# Patient Record
Sex: FEMALE | Race: WHITE | NOT HISPANIC OR LATINO | Employment: OTHER | URBAN - METROPOLITAN AREA
[De-identification: names, ages, dates, MRNs, and addresses within clinical notes are randomized per-mention and may not be internally consistent; named-entity substitution may affect disease eponyms.]

---

## 2017-06-03 ENCOUNTER — HOSPITAL ENCOUNTER (EMERGENCY)
Facility: HOSPITAL | Age: 77
Discharge: HOME | End: 2017-06-03
Attending: EMERGENCY MEDICINE | Admitting: EMERGENCY MEDICINE

## 2017-06-03 VITALS
SYSTOLIC BLOOD PRESSURE: 175 MMHG | RESPIRATION RATE: 20 BRPM | HEIGHT: 61 IN | WEIGHT: 120 LBS | BODY MASS INDEX: 22.66 KG/M2 | TEMPERATURE: 97.7 F | OXYGEN SATURATION: 98 % | HEART RATE: 95 BPM | DIASTOLIC BLOOD PRESSURE: 80 MMHG

## 2017-06-03 DIAGNOSIS — I10 ESSENTIAL HYPERTENSION: Primary | ICD-10-CM

## 2017-06-03 PROCEDURE — 99284 EMERGENCY DEPT VISIT MOD MDM: CPT | Performed by: EMERGENCY MEDICINE

## 2017-06-03 PROCEDURE — 99283 EMERGENCY DEPT VISIT LOW MDM: CPT

## 2017-06-03 RX ORDER — GLIMEPIRIDE 1 MG/1
1 TABLET ORAL
COMMUNITY

## 2017-06-03 RX ORDER — AMLODIPINE BESYLATE 5 MG/1
2.5 TABLET ORAL 2 TIMES DAILY
COMMUNITY
End: 2018-03-06 | Stop reason: SDUPTHER

## 2017-06-03 RX ORDER — CHLORAL HYDRATE 500 MG
CAPSULE ORAL
COMMUNITY

## 2017-06-03 RX ORDER — MULTIPLE VITAMINS W/ MINERALS TAB 9MG-400MCG
1 TAB ORAL DAILY
COMMUNITY

## 2017-06-04 NOTE — ED NOTES
Pt states that she had a headache and dizzyness earlier, but have since resolved     Denis Phillips, NELLI  06/03/17 2053

## 2017-06-04 NOTE — DISCHARGE INSTRUCTIONS
Follow-up with Dr. Coyne on Monday.  Return to the emergency department if your heart rate goes below 50.  Return if you have dizziness, weakness, chest pain, or shortness of breath.  Return to emergency department if your blood pressure is higher than 180/100.

## 2017-06-04 NOTE — ED PROVIDER NOTES
Subjective   History of Present Illness  History of Present Illness    Chief complaint: Elevated blood pressure and headache    Location: Frontal headache    Quality/Severity:  5/10 at its worst, 1/10 now    Timing/Onset/Duration: Blood pressure noted to be 237/111.      Modifying Factors: Nothing seems to make it better or worse    Associated Symptoms: She complains of a frontal headache.  No fever chills or cough.  No sore throat earache or nasal congestion.  No chest pain or shortness breath.  No abdominal pain.  No diarrhea or burning when she urinates.,  Tingling, weakness, or change in bladder or bowel function.    Narrative: This 76-year-old white female presents with complaint of a headache and elevated blood pressure.  Headache was a frontal headache at 5/10 at its worst, it is 2/10 now.  Patient took half of an 5 milligram amlodipine at 7 PM.  The patient denies any chest pain or shortness breath.  No abdominal pain.  No numbness, tingling, weakness or change in bladder or bowel function.  Patient has been compliant with her medications.    PCP:  Doretha      Review of Systems   Constitutional: Negative for chills and fever.   HENT: Negative for congestion, ear pain and sore throat.    Eyes: Negative for pain and discharge.   Respiratory: Negative for cough, chest tightness, shortness of breath and wheezing.    Cardiovascular: Negative for chest pain.   Gastrointestinal: Negative for abdominal pain, diarrhea, nausea and vomiting.   Genitourinary: Negative for dysuria and flank pain.   Musculoskeletal: Negative for back pain and neck pain.   Skin: Negative for rash.   Neurological: Positive for headaches. Negative for weakness and numbness.   Hematological: Negative for adenopathy.   Psychiatric/Behavioral: Negative for confusion.        Medication List      ASK your doctor about these medications          amLODIPine 5 MG tablet   Commonly known as:  NORVASC       fish oil 1000 MG capsule capsule        glimepiride 1 MG tablet   Commonly known as:  AMARYL       JANUVIA 50 MG tablet   Generic drug:  SITagliptin       multivitamin with minerals tablet tablet           History reviewed. No pertinent past medical history.    No Known Allergies    Past Surgical History:   Procedure Laterality Date   • EYE SURGERY         History reviewed. No pertinent family history.    Social History     Social History   • Marital status:      Spouse name: N/A   • Number of children: N/A   • Years of education: N/A     Social History Main Topics   • Smoking status: Never Smoker   • Smokeless tobacco: None   • Alcohol use Yes      Comment: SOCIAL   • Drug use: Defer   • Sexual activity: Defer     Other Topics Concern   • None     Social History Narrative   • None           Objective   Physical Exam   Constitutional: She is oriented to person, place, and time. She appears well-developed and well-nourished. No distress.   ED Triage Vitals:  Temp: 97.7 °F (36.5 °C) (06/03/17 2037)  Heart Rate: 95 (06/03/17 2037)  Resp: 20 (06/03/17 2037)  BP: 237/111 (06/03/17 2037)  SpO2: 98 % (06/03/17 2037)  Temp src: Oral (06/03/17 2037)  Heart Rate Source: n/a  Patient Position: Sitting (06/03/17 2037)  BP Location: Right arm (06/03/17 2037)  FiO2 (%): n/a    The patient's vitals were reviewed by me.  Unless otherwise noted they are within normal limits.     HENT:   Head: Normocephalic and atraumatic.   Right Ear: External ear normal.   Left Ear: External ear normal.   Nose: Nose normal.   Mouth/Throat: Oropharynx is clear and moist.   Eyes: Conjunctivae and EOM are normal. Pupils are equal, round, and reactive to light. Right eye exhibits no discharge. Left eye exhibits no discharge.   Neck: Normal range of motion. Neck supple. No JVD present. No tracheal deviation present. No thyromegaly present.   No meningeal signs   Cardiovascular: Normal rate, regular rhythm, normal heart sounds and intact distal pulses.  Exam reveals no gallop and no  friction rub.    No murmur heard.  Pulmonary/Chest: Effort normal and breath sounds normal. No stridor. No respiratory distress. She has no wheezes. She has no rales. She exhibits no tenderness.   Abdominal: Soft. Bowel sounds are normal. She exhibits no distension and no mass. There is no tenderness. There is no rebound and no guarding. No hernia.   Musculoskeletal: Normal range of motion. She exhibits no edema or deformity.   Lymphadenopathy:     She has no cervical adenopathy.   Neurological: She is alert and oriented to person, place, and time. No cranial nerve deficit. She exhibits normal muscle tone. Coordination normal.   Skin: Skin is warm and dry. No rash noted. She is not diaphoretic. No erythema. No pallor.   Psychiatric: Her behavior is normal.   Nursing note and vitals reviewed.      Procedures         ED Course  ED Course      9:40 PM, 06/03/17:  The patient feels better.  Her blood pressure is down.  The patient's blood pressure is 175/80.  Neurological exam: Conscious alert oriented ×4 with no focal deficits noted.    9:40 PM, 06/03/17:  The patient's diagnosis of hypertension was discussed with her.  The plan will be to discharge her home.  She should follow-up with Dr. Coyne next week.  She should return to the emergency department if she has increasing headache, numbness, tingling, weakness, difficulty swallowing or speaking, chest pain, shortness of breath, worse in any way at all.  All the patient's questions were answered she will be discharged in good condition.            MDM  No orders to display     Labs Reviewed - No data to display  No results found.    Final diagnoses:   None         ED Medications:  Medications - No data to display    New Medications:     Medication List      ASK your doctor about these medications          amLODIPine 5 MG tablet   Commonly known as:  NORVASC       fish oil 1000 MG capsule capsule       glimepiride 1 MG tablet   Commonly known as:  AMARYL       JANUVIA  50 MG tablet   Generic drug:  SITagliptin       multivitamin with minerals tablet tablet           Stopped Medications:     Medication List      ASK your doctor about these medications          amLODIPine 5 MG tablet   Commonly known as:  NORVASC       fish oil 1000 MG capsule capsule       glimepiride 1 MG tablet   Commonly known as:  AMARYL       JANUVIA 50 MG tablet   Generic drug:  SITagliptin       multivitamin with minerals tablet tablet             Final diagnoses:   Essential hypertension            Skip Fountain MD  06/03/17 7889

## 2017-07-11 ENCOUNTER — OFFICE VISIT (OUTPATIENT)
Dept: CARDIOLOGY | Facility: CLINIC | Age: 77
End: 2017-07-11

## 2017-07-11 VITALS
SYSTOLIC BLOOD PRESSURE: 160 MMHG | HEIGHT: 60 IN | BODY MASS INDEX: 22.79 KG/M2 | WEIGHT: 116.1 LBS | HEART RATE: 66 BPM | DIASTOLIC BLOOD PRESSURE: 74 MMHG

## 2017-07-11 DIAGNOSIS — I10 ESSENTIAL HYPERTENSION: Primary | ICD-10-CM

## 2017-07-11 DIAGNOSIS — R00.1 BRADYCARDIA: ICD-10-CM

## 2017-07-11 PROCEDURE — 93000 ELECTROCARDIOGRAM COMPLETE: CPT | Performed by: INTERNAL MEDICINE

## 2017-07-11 PROCEDURE — 99204 OFFICE O/P NEW MOD 45 MIN: CPT | Performed by: INTERNAL MEDICINE

## 2017-07-11 RX ORDER — BIMATOPROST 0.01 %
1 DROPS OPHTHALMIC (EYE) NIGHTLY
COMMUNITY
Start: 2017-06-26 | End: 2017-11-28 | Stop reason: ALTCHOICE

## 2017-07-11 NOTE — PROGRESS NOTES
Date of Office Visit: 2017  Encounter Provider: Philippe De Anda MD  Place of Service: [facility] CARDIOLOGY  Patient Name: Aida Vallejo  :1940    Chief Complaint   Patient presents with   • Hypertension   :     HPI: Aida Vallejo is a 76 y.o. female who presents today to be evaluated for hypertension.  I saw her in 2014 as well.  At that time, she had been on losartan and amlodipine but developed low blood pressure so her medications were adjusted.  She also noted periods where it felt like her heart rate dropped to the 50's and was irregular.  A Holter showed NSR with occasional sinus bradycardia, and rare periods of junctional escape (average rate in the 40's).      Those episodes have continued.  Sometimes it may occur twice a week, then she may go several weeks without it.      She has self-decreased her amlodipine (she was taking 1/4 of a 5mg tablet).  A few weeks ago she had to go to the ED for profound hypertension (237/111).  She checks her pressure twice a day every day and while it had been high, this was exceedingly high.  She was then placed on amlodipine 2.5mg BID, and her SBP at home ranges from 120-170 mm Hg, with an average SBP of 150 mm Hg.  This is her goal, and she doesn't want it lower than this.      She denies syncope, edema, orthopnea, PND, dyspnea, or chest pain.    Past Medical History:   Diagnosis Date   • Diabetes mellitus, type 2    • Hypertension        Past Surgical History:   Procedure Laterality Date   • EYE SURGERY         Social History     Social History   • Marital status:      Spouse name: N/A   • Number of children: N/A   • Years of education: N/A     Occupational History   • Not on file.     Social History Main Topics   • Smoking status: Never Smoker   • Smokeless tobacco: Never Used   • Alcohol use No   • Drug use: Defer   • Sexual activity: Defer     Other Topics Concern   • Not on file     Social History Narrative  "      History reviewed. No pertinent family history.    Review of Systems   Constitution: Negative for malaise/fatigue.   HENT: Positive for headaches.    Eyes: Negative for blurred vision.   Cardiovascular: Negative for chest pain, orthopnea, palpitations and paroxysmal nocturnal dyspnea.   Respiratory: Negative for shortness of breath.    Musculoskeletal: Negative for neck pain.   All other systems reviewed and are negative.      No Known Allergies      Current Outpatient Prescriptions:   •  amLODIPine (NORVASC) 5 MG tablet, Take 2.5 mg by mouth 2 (Two) Times a Day., Disp: , Rfl:   •  CALCIUM PO, Take 600 mg by mouth Daily., Disp: , Rfl:   •  Cholecalciferol (VITAMIN D PO), Take 1,000 Units by mouth., Disp: , Rfl:   •  CINNAMON PO, Take 1,000 mg by mouth Daily., Disp: , Rfl:   •  Coenzyme Q10 (CO Q 10 PO), Take 300 mg by mouth Daily., Disp: , Rfl:   •  glimepiride (AMARYL) 1 MG tablet, Take 1 mg by mouth Every Morning Before Breakfast., Disp: , Rfl:   •  LUMIGAN 0.01 % ophthalmic drops, Administer 1 drop to both eyes Every Night., Disp: , Rfl:   •  Multiple Vitamins-Minerals (MULTIVITAMIN WITH MINERALS) tablet tablet, Take 1 tablet by mouth Daily., Disp: , Rfl:   •  Omega-3 Fatty Acids (FISH OIL) 1000 MG capsule capsule, Take  by mouth Daily With Breakfast., Disp: , Rfl:   •  SITagliptin (JANUVIA) 50 MG tablet, Take 50 mg by mouth Daily., Disp: , Rfl:      Objective:     Vitals:    07/11/17 1355   BP: 160/74   Pulse: 66   Weight: 116 lb 1.6 oz (52.7 kg)   Height: 60\" (152.4 cm)     Body mass index is 22.67 kg/(m^2).    Physical Exam   Constitutional: She is oriented to person, place, and time. She appears well-developed and well-nourished.   HENT:   Head: Normocephalic.   Nose: Nose normal.   Mouth/Throat: Oropharynx is clear and moist.   Eyes: Conjunctivae and EOM are normal. Pupils are equal, round, and reactive to light.   Neck: Normal range of motion. No JVD present.   Cardiovascular: Normal rate, regular " rhythm, normal heart sounds and intact distal pulses.    No murmur heard.  Pulmonary/Chest: Effort normal and breath sounds normal.   Abdominal: Soft. She exhibits no mass. There is no tenderness.   Musculoskeletal: Normal range of motion. She exhibits no edema.   Lymphadenopathy:     She has no cervical adenopathy.   Neurological: She is alert and oriented to person, place, and time. No cranial nerve deficit.   Skin: Skin is warm and dry. No rash noted.   Psychiatric: She has a normal mood and affect. Her behavior is normal. Judgment and thought content normal.   Vitals reviewed.        ECG 12 Lead  Date/Time: 7/11/2017 3:44 PM  Performed by: PHILIPPE DE ANDA  Authorized by: PHILIPPE DE ANDA   Comparison: compared with previous ECG   Rhythm: sinus rhythm  Conduction: conduction normal  ST Segments: ST segments normal  T Waves: T waves normal  QRS axis: normal  Other: no other findings  Clinical impression: normal ECG              Assessment:       Diagnosis Plan   1. Essential hypertension  Duplex Renal Artery - Bilateral Complete CAR   2. Bradycardia  Holter / Event ZIO Patch          Plan:       1.  Her average SBP is 150 mm Hg, which is her goal.  She states that she feels very poor when it's consistently lower than this.  Obviously this is not ideal but she is quite committed to this.  I am going to check a renal artery doppler as she had such a profound spike on the day of her ED visit.    2.  She continues to have intermittent episodes of low heart rate; a prior Holter showed a junctional escape rhythm.  I am going to get a Zio to assess the burden of this.      Sincerely,       Philippe De Anda MD

## 2017-08-02 DIAGNOSIS — I49.49 JUNCTIONAL ESCAPE RHYTHM: Primary | ICD-10-CM

## 2017-08-07 ENCOUNTER — TELEPHONE (OUTPATIENT)
Dept: CARDIOLOGY | Facility: CLINIC | Age: 77
End: 2017-08-07

## 2017-08-07 NOTE — TELEPHONE ENCOUNTER
Can you set this pt up for the renal doppler? She is also to have a holter. It might be easier to have it done the same day as her doppler, but if she wants if for BNE, let me know.

## 2017-08-07 NOTE — TELEPHONE ENCOUNTER
My understanding is that they don't perform renal artery dopplers at [facility].  Has that changed?    JDK

## 2017-08-07 NOTE — TELEPHONE ENCOUNTER
S/w Neri in scheduling. Do you want pt to have her duplex done at [facility]? If so, Neri said the order needed to be changed to US. Pls advise.

## 2017-08-11 ENCOUNTER — TELEPHONE (OUTPATIENT)
Dept: CARDIOLOGY | Facility: CLINIC | Age: 77
End: 2017-08-11

## 2017-08-11 NOTE — TELEPHONE ENCOUNTER
Pt called to inquire her upcoming testing.  Order is for pt to have a Renal Artery duplex.  Pt left a vm that she has had a Carotid US.  I called pt back to inform her that the test are not the same, but had to leave a vm.

## 2017-08-15 ENCOUNTER — HOSPITAL ENCOUNTER (OUTPATIENT)
Dept: CARDIOLOGY | Facility: HOSPITAL | Age: 77
Discharge: HOME | End: 2017-08-15
Attending: INTERNAL MEDICINE | Admitting: INTERNAL MEDICINE

## 2017-08-15 DIAGNOSIS — I49.49 JUNCTIONAL ESCAPE RHYTHM: ICD-10-CM

## 2017-08-15 PROCEDURE — 93226 XTRNL ECG REC<48 HR SCAN A/R: CPT

## 2017-08-15 PROCEDURE — 93225 XTRNL ECG REC<48 HRS REC: CPT

## 2017-08-17 PROCEDURE — 93227 XTRNL ECG REC<48 HR R&I: CPT | Performed by: INTERNAL MEDICINE

## 2017-09-20 ENCOUNTER — HOSPITAL ENCOUNTER (OUTPATIENT)
Dept: CARDIOLOGY | Facility: HOSPITAL | Age: 77
Discharge: HOME | End: 2017-09-20
Attending: INTERNAL MEDICINE | Admitting: INTERNAL MEDICINE

## 2017-09-20 DIAGNOSIS — I10 ESSENTIAL HYPERTENSION: ICD-10-CM

## 2017-09-20 PROCEDURE — 93975 VASCULAR STUDY: CPT | Performed by: INTERNAL MEDICINE

## 2017-09-21 LAB
BH CV ECHO MEAS - DIST REN A EDV LEFT: -21.5 CM/SEC
BH CV ECHO MEAS - DIST REN A PSV LEFT: -134 CM/SEC
BH CV ECHO MEAS - DIST REN A RI LEFT: 0.84
BH CV ECHO MEAS - MID REN A EDV LEFT: -29.3 CM/SEC
BH CV ECHO MEAS - MID REN A PSV LEFT: -117 CM/SEC
BH CV ECHO MEAS - MID REN A RI LEFT: 0.75
BH CV ECHO MEAS - PROX REN A EDV LEFT: -22.5 CM/SEC
BH CV ECHO MEAS - PROX REN A PSV LEFT: -127 CM/SEC
BH CV ECHO MEAS - PROX REN A RI LEFT: 0.82
BH CV VAS KIDNEY HEIGHT LEFT: 4.4 CM
BH CV VAS RENAL AORTIC MID PSV: 134 CM/S
BH CV XLRA MEAS - KID L LEFT: 9.3 CM
BH CV XLRA MEAS DIST REN A EDV RIGHT: -45 CM/SEC
BH CV XLRA MEAS DIST REN A PSV RIGHT: -153 CM/SEC
BH CV XLRA MEAS DIST REN A RI RIGHT: 0.71
BH CV XLRA MEAS KID H RIGHT: 4.6 CM
BH CV XLRA MEAS KID L RIGHT: 8.9 CM
BH CV XLRA MEAS MID REN A EDV RIGHT: -20.5 CM/SEC
BH CV XLRA MEAS MID REN A PSV RIGHT: -114 CM/SEC
BH CV XLRA MEAS MID REN A RI RIGHT: 0.82
BH CV XLRA MEAS PROX REN A EDV RIGHT: -24.4 CM/SEC
BH CV XLRA MEAS PROX REN A PSV RIGHT: -126 CM/SEC
BH CV XLRA MEAS PROX REN A RI RIGHT: 0.81
BH CV XLRA MEAS RAR LEFT: 1
BH CV XLRA MEAS RAR RIGHT: 1.14

## 2017-09-22 ENCOUNTER — HOSPITAL ENCOUNTER (INPATIENT)
Facility: HOSPITAL | Age: 77
LOS: 3 days | Discharge: HOME | End: 2017-09-25
Attending: INTERNAL MEDICINE | Admitting: INTERNAL MEDICINE

## 2017-09-22 ENCOUNTER — HOSPITAL ENCOUNTER (EMERGENCY)
Facility: HOSPITAL | Age: 77
Discharge: ANOTHER HOSPITAL | End: 2017-09-22
Attending: EMERGENCY MEDICINE | Admitting: EMERGENCY MEDICINE

## 2017-09-22 ENCOUNTER — APPOINTMENT (OUTPATIENT)
Dept: GENERAL RADIOLOGY | Facility: HOSPITAL | Age: 77
End: 2017-09-22

## 2017-09-22 ENCOUNTER — APPOINTMENT (OUTPATIENT)
Dept: CARDIOLOGY | Facility: HOSPITAL | Age: 77
End: 2017-09-22
Attending: INTERNAL MEDICINE

## 2017-09-22 VITALS
DIASTOLIC BLOOD PRESSURE: 65 MMHG | WEIGHT: 116 LBS | HEIGHT: 60 IN | OXYGEN SATURATION: 97 % | HEART RATE: 38 BPM | SYSTOLIC BLOOD PRESSURE: 135 MMHG | TEMPERATURE: 97.6 F | BODY MASS INDEX: 22.78 KG/M2 | RESPIRATION RATE: 16 BRPM

## 2017-09-22 DIAGNOSIS — R00.1 SYMPTOMATIC BRADYCARDIA: Primary | ICD-10-CM

## 2017-09-22 DIAGNOSIS — E87.1 HYPONATREMIA: ICD-10-CM

## 2017-09-22 DIAGNOSIS — R79.0 LOW MAGNESIUM LEVELS: ICD-10-CM

## 2017-09-22 LAB
ALBUMIN SERPL-MCNC: 3.9 G/DL (ref 3.5–5.2)
ALBUMIN/GLOB SERPL: 1.2 G/DL
ALP SERPL-CCNC: 49 U/L (ref 40–129)
ALT SERPL W P-5'-P-CCNC: 50 U/L (ref 5–33)
ANION GAP SERPL CALCULATED.3IONS-SCNC: 16.7 MMOL/L
APTT PPP: 43.5 SECONDS (ref 24.3–38.1)
AST SERPL-CCNC: 46 U/L (ref 5–32)
BASOPHILS # BLD AUTO: 0.04 10*3/MM3 (ref 0–0.2)
BASOPHILS NFR BLD AUTO: 0.4 % (ref 0–2)
BH CV ECHO MEAS - ACS: 1.7 CM
BH CV ECHO MEAS - AO MAX PG (FULL): 5.2 MMHG
BH CV ECHO MEAS - AO MAX PG: 10.8 MMHG
BH CV ECHO MEAS - AO MEAN PG (FULL): 2 MMHG
BH CV ECHO MEAS - AO MEAN PG: 5 MMHG
BH CV ECHO MEAS - AO ROOT AREA (BSA CORRECTED): 1.6
BH CV ECHO MEAS - AO ROOT AREA: 4.9 CM^2
BH CV ECHO MEAS - AO ROOT DIAM: 2.5 CM
BH CV ECHO MEAS - AO V2 MAX: 164 CM/SEC
BH CV ECHO MEAS - AO V2 MEAN: 107 CM/SEC
BH CV ECHO MEAS - AO V2 VTI: 41 CM
BH CV ECHO MEAS - AVA(I,A): 2 CM^2
BH CV ECHO MEAS - AVA(I,D): 2 CM^2
BH CV ECHO MEAS - AVA(V,A): 2 CM^2
BH CV ECHO MEAS - AVA(V,D): 2 CM^2
BH CV ECHO MEAS - BSA(HAYCOCK): 1.5 M^2
BH CV ECHO MEAS - BSA: 1.5 M^2
BH CV ECHO MEAS - BZI_BMI: 24 KILOGRAMS/M^2
BH CV ECHO MEAS - BZI_METRIC_HEIGHT: 152.4 CM
BH CV ECHO MEAS - BZI_METRIC_WEIGHT: 55.8 KG
BH CV ECHO MEAS - CONTRAST EF (2CH): 72 ML/M^2
BH CV ECHO MEAS - CONTRAST EF 4CH: 68 ML/M^2
BH CV ECHO MEAS - EDV(CUBED): 46.7 ML
BH CV ECHO MEAS - EDV(MOD-SP2): 75 ML
BH CV ECHO MEAS - EDV(MOD-SP4): 75 ML
BH CV ECHO MEAS - EDV(TEICH): 54.4 ML
BH CV ECHO MEAS - EF(CUBED): 57.8 %
BH CV ECHO MEAS - EF(MOD-SP2): 72 %
BH CV ECHO MEAS - EF(MOD-SP4): 68 %
BH CV ECHO MEAS - EF(TEICH): 50.4 %
BH CV ECHO MEAS - ESV(CUBED): 19.7 ML
BH CV ECHO MEAS - ESV(MOD-SP2): 21 ML
BH CV ECHO MEAS - ESV(MOD-SP4): 24 ML
BH CV ECHO MEAS - ESV(TEICH): 27 ML
BH CV ECHO MEAS - FS: 25 %
BH CV ECHO MEAS - IVS/LVPW: 1
BH CV ECHO MEAS - IVSD: 1.2 CM
BH CV ECHO MEAS - LAT PEAK E' VEL: 6 CM/SEC
BH CV ECHO MEAS - LV DIASTOLIC VOL/BSA (35-75): 49.4 ML/M^2
BH CV ECHO MEAS - LV MASS(C)D: 141.5 GRAMS
BH CV ECHO MEAS - LV MASS(C)DI: 93.2 GRAMS/M^2
BH CV ECHO MEAS - LV MAX PG: 5.6 MMHG
BH CV ECHO MEAS - LV MEAN PG: 3 MMHG
BH CV ECHO MEAS - LV SYSTOLIC VOL/BSA (12-30): 15.8 ML/M^2
BH CV ECHO MEAS - LV V1 MAX: 118 CM/SEC
BH CV ECHO MEAS - LV V1 MEAN: 72.7 CM/SEC
BH CV ECHO MEAS - LV V1 VTI: 29.1 CM
BH CV ECHO MEAS - LVIDD: 3.6 CM
BH CV ECHO MEAS - LVIDS: 2.7 CM
BH CV ECHO MEAS - LVLD AP2: 6.5 CM
BH CV ECHO MEAS - LVLD AP4: 6.4 CM
BH CV ECHO MEAS - LVLS AP2: 6 CM
BH CV ECHO MEAS - LVLS AP4: 5.4 CM
BH CV ECHO MEAS - LVOT AREA (M): 2.8 CM^2
BH CV ECHO MEAS - LVOT AREA: 2.8 CM^2
BH CV ECHO MEAS - LVOT DIAM: 1.9 CM
BH CV ECHO MEAS - LVPWD: 1.2 CM
BH CV ECHO MEAS - MED PEAK E' VEL: 5 CM/SEC
BH CV ECHO MEAS - MV A DUR: 0.14 SEC
BH CV ECHO MEAS - MV A MAX VEL: 143 CM/SEC
BH CV ECHO MEAS - MV DEC SLOPE: 345 CM/SEC^2
BH CV ECHO MEAS - MV DEC TIME: 0.27 SEC
BH CV ECHO MEAS - MV E MAX VEL: 104 CM/SEC
BH CV ECHO MEAS - MV E/A: 0.73
BH CV ECHO MEAS - MV MAX PG: 8.6 MMHG
BH CV ECHO MEAS - MV MEAN PG: 3 MMHG
BH CV ECHO MEAS - MV P1/2T MAX VEL: 112 CM/SEC
BH CV ECHO MEAS - MV P1/2T: 95.1 MSEC
BH CV ECHO MEAS - MV V2 MAX: 147 CM/SEC
BH CV ECHO MEAS - MV V2 MEAN: 74 CM/SEC
BH CV ECHO MEAS - MV V2 VTI: 45.2 CM
BH CV ECHO MEAS - MVA P1/2T LCG: 2 CM^2
BH CV ECHO MEAS - MVA(P1/2T): 2.3 CM^2
BH CV ECHO MEAS - MVA(VTI): 1.8 CM^2
BH CV ECHO MEAS - PA ACC TIME: 0.13 SEC
BH CV ECHO MEAS - PA MAX PG (FULL): 0.9 MMHG
BH CV ECHO MEAS - PA MAX PG: 3.5 MMHG
BH CV ECHO MEAS - PA PR(ACCEL): 18.7 MMHG
BH CV ECHO MEAS - PA V2 MAX: 94.1 CM/SEC
BH CV ECHO MEAS - PULM A REVS DUR: 0.16 SEC
BH CV ECHO MEAS - PULM A REVS VEL: 30.6 CM/SEC
BH CV ECHO MEAS - PULM DIAS VEL: 39.8 CM/SEC
BH CV ECHO MEAS - PULM S/D: 1.7
BH CV ECHO MEAS - PULM SYS VEL: 69.4 CM/SEC
BH CV ECHO MEAS - PVA(V,A): 1.7 CM^2
BH CV ECHO MEAS - PVA(V,D): 1.7 CM^2
BH CV ECHO MEAS - QP/QS: 0.5
BH CV ECHO MEAS - RAP SYSTOLE: 8 MMHG
BH CV ECHO MEAS - RV MAX PG: 2.6 MMHG
BH CV ECHO MEAS - RV MEAN PG: 2 MMHG
BH CV ECHO MEAS - RV V1 MAX: 81.3 CM/SEC
BH CV ECHO MEAS - RV V1 MEAN: 60.5 CM/SEC
BH CV ECHO MEAS - RV V1 VTI: 20.4 CM
BH CV ECHO MEAS - RVOT AREA: 2 CM^2
BH CV ECHO MEAS - RVOT DIAM: 1.6 CM
BH CV ECHO MEAS - SI(AO): 132.6 ML/M^2
BH CV ECHO MEAS - SI(CUBED): 17.8 ML/M^2
BH CV ECHO MEAS - SI(LVOT): 54.3 ML/M^2
BH CV ECHO MEAS - SI(MOD-SP2): 35.6 ML/M^2
BH CV ECHO MEAS - SI(MOD-SP4): 33.6 ML/M^2
BH CV ECHO MEAS - SI(TEICH): 18.1 ML/M^2
BH CV ECHO MEAS - SV(AO): 201.3 ML
BH CV ECHO MEAS - SV(CUBED): 27 ML
BH CV ECHO MEAS - SV(LVOT): 82.5 ML
BH CV ECHO MEAS - SV(MOD-SP2): 54 ML
BH CV ECHO MEAS - SV(MOD-SP4): 51 ML
BH CV ECHO MEAS - SV(RVOT): 41 ML
BH CV ECHO MEAS - SV(TEICH): 27.4 ML
BH CV ECHO MEAS - TAPSE (>1.6): 2.4 CM2
BH CV VAS BP RIGHT ARM: NORMAL MMHG
BH CV XLRA - RV BASE: 3.11 CM
BH CV XLRA - TDI S': 12 CM/SEC
BILIRUB SERPL-MCNC: 0.4 MG/DL (ref 0.2–1.2)
BUN BLD-MCNC: 33 MG/DL (ref 8–23)
BUN/CREAT SERPL: 20.5 (ref 7–25)
CALCIUM SPEC-SCNC: 8.6 MG/DL (ref 8.8–10.5)
CHLORIDE SERPL-SCNC: 89 MMOL/L (ref 98–107)
CO2 SERPL-SCNC: 20.3 MMOL/L (ref 22–29)
CREAT BLD-MCNC: 1.61 MG/DL (ref 0.57–1)
DEPRECATED RDW RBC AUTO: 41 FL (ref 37–54)
E/E' RATIO: 20
EOSINOPHIL # BLD AUTO: 0.14 10*3/MM3 (ref 0.1–0.3)
EOSINOPHIL NFR BLD AUTO: 1.5 % (ref 0–4)
ERYTHROCYTE [DISTWIDTH] IN BLOOD BY AUTOMATED COUNT: 13.2 % (ref 11.5–14.5)
GFR SERPL CREATININE-BSD FRML MDRD: 31 ML/MIN/1.73
GLOBULIN UR ELPH-MCNC: 3.2 GM/DL
GLUCOSE BLD-MCNC: 154 MG/DL (ref 65–99)
GLUCOSE BLDC GLUCOMTR-MCNC: 119 MG/DL (ref 70–130)
GLUCOSE BLDC GLUCOMTR-MCNC: 88 MG/DL (ref 70–130)
HCT VFR BLD AUTO: 38.4 % (ref 37–47)
HGB BLD-MCNC: 12.9 G/DL (ref 12–16)
HOLD SPECIMEN: NORMAL
HOLD SPECIMEN: NORMAL
IMM GRANULOCYTES # BLD: 0.02 10*3/MM3 (ref 0–0.03)
IMM GRANULOCYTES NFR BLD: 0.2 % (ref 0–0.5)
INR PPP: 1.02 (ref 0.9–1.1)
LEFT ATRIUM VOLUME INDEX: 30 ML/M2
LV EF 2D ECHO EST: 68 %
LYMPHOCYTES # BLD AUTO: 2.69 10*3/MM3 (ref 0.6–4.8)
LYMPHOCYTES NFR BLD AUTO: 29.6 % (ref 20–45)
MAGNESIUM SERPL-MCNC: 1.3 MG/DL (ref 1.7–2.5)
MAGNESIUM SERPL-MCNC: 1.8 MG/DL (ref 1.6–2.4)
MCH RBC QN AUTO: 28.9 PG (ref 27–31)
MCHC RBC AUTO-ENTMCNC: 33.6 G/DL (ref 31–37)
MCV RBC AUTO: 85.9 FL (ref 81–99)
MONOCYTES # BLD AUTO: 0.95 10*3/MM3 (ref 0–1)
MONOCYTES NFR BLD AUTO: 10.4 % (ref 3–8)
NEUTROPHILS # BLD AUTO: 5.26 10*3/MM3 (ref 1.5–8.3)
NEUTROPHILS NFR BLD AUTO: 57.9 % (ref 45–70)
NRBC BLD MANUAL-RTO: 0 /100 WBC (ref 0–0)
PLATELET # BLD AUTO: 227 10*3/MM3 (ref 140–500)
PMV BLD AUTO: 10.1 FL (ref 7.4–10.4)
POTASSIUM BLD-SCNC: 4.8 MMOL/L (ref 3.5–5.2)
PROT SERPL-MCNC: 7.1 G/DL (ref 6–8.5)
PROTHROMBIN TIME: 13.4 SECONDS (ref 12.1–15)
RBC # BLD AUTO: 4.47 10*6/MM3 (ref 4.2–5.4)
SODIUM BLD-SCNC: 126 MMOL/L (ref 136–145)
TROPONIN T SERPL-MCNC: <0.01 NG/ML (ref 0–0.03)
TROPONIN T SERPL-MCNC: <0.01 NG/ML (ref 0–0.03)
TSH SERPL DL<=0.05 MIU/L-ACNC: 2.35 MIU/ML (ref 0.27–4.2)
WBC NRBC COR # BLD: 9.1 10*3/MM3 (ref 4.8–10.8)
WHOLE BLOOD HOLD SPECIMEN: NORMAL
WHOLE BLOOD HOLD SPECIMEN: NORMAL

## 2017-09-22 PROCEDURE — 84484 ASSAY OF TROPONIN QUANT: CPT

## 2017-09-22 PROCEDURE — 83735 ASSAY OF MAGNESIUM: CPT

## 2017-09-22 PROCEDURE — 82962 GLUCOSE BLOOD TEST: CPT

## 2017-09-22 PROCEDURE — 99284 EMERGENCY DEPT VISIT MOD MDM: CPT

## 2017-09-22 PROCEDURE — 85610 PROTHROMBIN TIME: CPT | Performed by: EMERGENCY MEDICINE

## 2017-09-22 PROCEDURE — 85730 THROMBOPLASTIN TIME PARTIAL: CPT | Performed by: EMERGENCY MEDICINE

## 2017-09-22 PROCEDURE — 96365 THER/PROPH/DIAG IV INF INIT: CPT

## 2017-09-22 PROCEDURE — 84443 ASSAY THYROID STIM HORMONE: CPT | Performed by: EMERGENCY MEDICINE

## 2017-09-22 PROCEDURE — 25010000002 MAGNESIUM SULFATE IN D5W 1G/100ML (PREMIX) 1-5 GM/100ML-% SOLUTION: Performed by: EMERGENCY MEDICINE

## 2017-09-22 PROCEDURE — 99223 1ST HOSP IP/OBS HIGH 75: CPT | Performed by: INTERNAL MEDICINE

## 2017-09-22 PROCEDURE — 93306 TTE W/DOPPLER COMPLETE: CPT | Performed by: INTERNAL MEDICINE

## 2017-09-22 PROCEDURE — 99291 CRITICAL CARE FIRST HOUR: CPT | Performed by: EMERGENCY MEDICINE

## 2017-09-22 PROCEDURE — 93005 ELECTROCARDIOGRAM TRACING: CPT

## 2017-09-22 PROCEDURE — 25010000003 CEFAZOLIN IN DEXTROSE 2-4 GM/100ML-% SOLUTION: Performed by: INTERNAL MEDICINE

## 2017-09-22 PROCEDURE — 93306 TTE W/DOPPLER COMPLETE: CPT

## 2017-09-22 PROCEDURE — 71010 HC CHEST PA OR AP: CPT

## 2017-09-22 PROCEDURE — 83735 ASSAY OF MAGNESIUM: CPT | Performed by: INTERNAL MEDICINE

## 2017-09-22 PROCEDURE — 25010000002 PERFLUTREN (DEFINITY) 8.476 MG IN SODIUM CHLORIDE 0.9 % 10 ML INJECTION: Performed by: INTERNAL MEDICINE

## 2017-09-22 PROCEDURE — 93010 ELECTROCARDIOGRAM REPORT: CPT | Performed by: INTERNAL MEDICINE

## 2017-09-22 PROCEDURE — 80053 COMPREHEN METABOLIC PANEL: CPT

## 2017-09-22 PROCEDURE — 84484 ASSAY OF TROPONIN QUANT: CPT | Performed by: INTERNAL MEDICINE

## 2017-09-22 PROCEDURE — 85025 COMPLETE CBC W/AUTO DIFF WBC: CPT

## 2017-09-22 RX ORDER — GLIMEPIRIDE 1 MG/1
1 TABLET ORAL
Status: DISCONTINUED | OUTPATIENT
Start: 2017-09-23 | End: 2017-09-23

## 2017-09-22 RX ORDER — LANOLIN ALCOHOL/MO/W.PET/CERES
1000 CREAM (GRAM) TOPICAL DAILY
COMMUNITY

## 2017-09-22 RX ORDER — FERROUS SULFATE 325(65) MG
325 TABLET ORAL
Status: DISCONTINUED | OUTPATIENT
Start: 2017-09-23 | End: 2017-09-25 | Stop reason: HOSPADM

## 2017-09-22 RX ORDER — SODIUM CHLORIDE 0.9 % (FLUSH) 0.9 %
10 SYRINGE (ML) INJECTION AS NEEDED
Status: DISCONTINUED | OUTPATIENT
Start: 2017-09-22 | End: 2017-09-22 | Stop reason: HOSPADM

## 2017-09-22 RX ORDER — MAGNESIUM SULFATE 1 G/100ML
1 INJECTION INTRAVENOUS ONCE
Status: COMPLETED | OUTPATIENT
Start: 2017-09-22 | End: 2017-09-22

## 2017-09-22 RX ORDER — CEFAZOLIN SODIUM 2 G/100ML
2 INJECTION, SOLUTION INTRAVENOUS ONCE
Status: COMPLETED | OUTPATIENT
Start: 2017-09-22 | End: 2017-09-22

## 2017-09-22 RX ORDER — CHOLECALCIFEROL (VITAMIN D3) 125 MCG
1000 CAPSULE ORAL DAILY
Status: DISCONTINUED | OUTPATIENT
Start: 2017-09-22 | End: 2017-09-25 | Stop reason: HOSPADM

## 2017-09-22 RX ORDER — ASPIRIN 81 MG/1
81 TABLET, CHEWABLE ORAL
COMMUNITY

## 2017-09-22 RX ORDER — FERROUS SULFATE 325(65) MG
325 TABLET ORAL
COMMUNITY

## 2017-09-22 RX ORDER — DORZOLAMIDE HYDROCHLORIDE AND TIMOLOL MALEATE 20; 5 MG/ML; MG/ML
1 SOLUTION/ DROPS OPHTHALMIC 2 TIMES DAILY
COMMUNITY

## 2017-09-22 RX ADMIN — MAGNESIUM SULFATE HEPTAHYDRATE 1 G: 1 INJECTION, SOLUTION INTRAVENOUS at 10:10

## 2017-09-22 RX ADMIN — Medication 10 ML: at 10:19

## 2017-09-22 RX ADMIN — PERFLUTREN 3 ML: 6.52 INJECTION, SUSPENSION INTRAVENOUS at 16:40

## 2017-09-22 RX ADMIN — CEFAZOLIN SODIUM 2 G: 2 INJECTION, SOLUTION INTRAVENOUS at 18:02

## 2017-09-22 RX ADMIN — SODIUM CHLORIDE 500 ML: 9 INJECTION, SOLUTION INTRAVENOUS at 09:41

## 2017-09-22 RX ADMIN — CYANOCOBALAMIN TAB 500 MCG 1000 MCG: 500 TAB at 18:02

## 2017-09-22 RX ADMIN — METFORMIN HYDROCHLORIDE 500 MG: 500 TABLET ORAL at 18:02

## 2017-09-22 NOTE — ED NOTES
Called bed board at [facility].  Pt has not yet been assigned a bed.     Erum Dawkins RN  09/22/17 0931

## 2017-09-22 NOTE — ED PROVIDER NOTES
Subjective   History of Present Illness  09/22/17, 9:17 AM  History of Present Illness    Chief complaint: Near syncope and bradycardia    Location: Not applicable    Quality/Severity:  Moderate intensity    Timing/Duration: Patient has been having some spells for several months and symptoms much worse since yesterday.    Modifying Factors: Patient did have a Holter monitor and August which did show some sinus pauses    Associated Symptoms: Vague air hunger.    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  The patient relates that since yesterday she has been having fairly frequent spells of lightheadedness and near syncope.  When the patient checked her pulse she found it to be in the 30s and 40s.  Denies chest pain.    Review of Systems   Constitutional: Negative for activity change, appetite change, fatigue and fever.   HENT: Negative for congestion.    Respiratory: Negative for cough, shortness of breath and wheezing.    Cardiovascular: Negative for chest pain, palpitations and leg swelling.   Gastrointestinal: Negative for abdominal pain, diarrhea, nausea and vomiting.   Endocrine: Negative for polydipsia.   Genitourinary: Negative for difficulty urinating, dysuria, flank pain, frequency and urgency.   Musculoskeletal: Negative for back pain.   Skin: Negative for rash.   Neurological: Positive for dizziness, weakness and light-headedness. Negative for headaches.   Psychiatric/Behavioral: Negative for confusion.       Past Medical History:   Diagnosis Date   • Diabetes mellitus, type 2    • Glaucoma    • Hypertension        No Known Allergies    Past Surgical History:   Procedure Laterality Date   • EYE SURGERY Bilateral     cataracts   • RETINAL LASER PROCEDURE         History reviewed. No pertinent family history.    Social History     Social History   • Marital status:      Spouse name: N/A   • Number of children: N/A   • Years of education: N/A      Social History Main Topics   • Smoking status: Never Smoker   • Smokeless tobacco: Never Used   • Alcohol use No   • Drug use: Defer   • Sexual activity: Defer     Other Topics Concern   • None     Social History Narrative   • None           Objective   Physical Exam   Constitutional: She is oriented to person, place, and time.   The patient is a healthy-appearing, 76-year-old, white female in no acute distress   HENT:   Head: Normocephalic and atraumatic.   Mouth/Throat: Oropharynx is clear and moist.   Eyes: Conjunctivae and EOM are normal.   Neck: Normal range of motion. Neck supple. No thyromegaly present.   Cardiovascular: Regular rhythm and normal heart sounds.    No murmur heard.  Bradycardia   Pulmonary/Chest: Effort normal and breath sounds normal. No respiratory distress. She has no wheezes. She has no rales.   Abdominal: Soft. Bowel sounds are normal. She exhibits no distension. There is no tenderness.   Musculoskeletal: Normal range of motion. She exhibits no edema or tenderness.   Lymphadenopathy:     She has no cervical adenopathy.   Neurological: She is alert and oriented to person, place, and time.   Skin: Skin is warm and dry. No rash noted.   Psychiatric: She has a normal mood and affect. Her behavior is normal. Judgment and thought content normal.   Nursing note and vitals reviewed.      Procedures         ED Course  ED Course   Comment By Time   09/22/17, 9:36 AM  Dr. Elsa Candelario spoken to and she is requesting a TSH level. Jacques Tomas MD 09/22 0936   09/22/17, 10:19 AM  Fluid and electrolyte replacement already initiated Jacques Tomas MD 09/22 1019   09/22/17, 11:52 AM  Dr. Romero spoken to again and she is arranging a bed at This time [facility].  Patient agreeable to transfer, but is insisting on transit by POV AGAINST MEDICAL ADVICE Jacques Tomas MD 09/22 1154   09/22/17, 1:21 PM  Patient now agreeable to ambulance transport and a bed has been  arranged. Jacques Tomas MD 09/22 1321      Xr Chest 1 View    Result Date: 9/22/2017  Narrative: INDICATION: Bradycardia  COMPARISON:  None available.  FINDINGS: Single portable AP view of the chest.  Heart and mediastinal contours are normal. The lungs are clear. No pneumothorax or pleural effusion.      Impression: No acute findings.  This report was finalized on 9/22/2017 10:23 AM by Dr. Loco Moreno MD.                MDM  Number of Diagnoses or Management Options  Hyponatremia: new and does not require workup  Low magnesium levels: new and does not require workup  Symptomatic bradycardia: new and requires workup     Amount and/or Complexity of Data Reviewed  Clinical lab tests: ordered and reviewed  Tests in the radiology section of CPT®: ordered and reviewed  Discuss the patient with other providers: yes  Independent visualization of images, tracings, or specimens: yes    Risk of Complications, Morbidity, and/or Mortality  Presenting problems: high  Diagnostic procedures: high  Management options: high    Critical Care  Total time providing critical care: 30-74 minutes    Labs this visit  Lab Results (last 24 hours)     Procedure Component Value Units Date/Time    CBC & Differential [[id number]] Collected:  09/22/17 0910    Specimen:  Blood Updated:  09/22/17 0915    Narrative:       The following orders were created for panel order CBC & Differential.  Procedure                               Abnormality         Status                     ---------                               -----------         ------                     CBC Auto Differential[[id number]]        Abnormal            Final result                 Please view results for these tests on the individual orders.    Comprehensive Metabolic Panel [[id number]]  (Abnormal) Collected:  09/22/17 0910    Specimen:  Blood Updated:  09/22/17 0933     Glucose 154 (H) mg/dL      BUN 33 (H) mg/dL      Creatinine 1.61 (H) mg/dL      Sodium 126 (L) mmol/L       Potassium 4.8 mmol/L      Chloride 89 (L) mmol/L      CO2 20.3 (L) mmol/L      Calcium 8.6 (L) mg/dL      Total Protein 7.1 g/dL      Albumin 3.90 g/dL      ALT (SGPT) 50 (H) U/L      AST (SGOT) 46 (H) U/L      Alkaline Phosphatase 49 U/L      Total Bilirubin 0.4 mg/dL      eGFR Non African Amer 31 (L) mL/min/1.73      Globulin 3.2 gm/dL      A/G Ratio 1.2 g/dL      BUN/Creatinine Ratio 20.5     Anion Gap 16.7 mmol/L     Narrative:       The MDRD GFR formula is only valid for adults with stable renal function between ages 18 and 70.    Magnesium [[id number]]  (Abnormal) Collected:  09/22/17 0910    Specimen:  Blood Updated:  09/22/17 0933     Magnesium 1.3 (L) mg/dL     Troponin [[id number]]  (Normal) Collected:  09/22/17 0910    Specimen:  Blood Updated:  09/22/17 0933     Troponin T <0.010 ng/mL     Narrative:       Troponin T Reference Ranges:  Less than 0.03 ng/mL:    Negative for AMI  0.03 to 0.09 ng/mL:      Indeterminant for AMI  Greater than 0.09 ng/mL: Positive for AMI    CBC Auto Differential [[id number]]  (Abnormal) Collected:  09/22/17 0910    Specimen:  Blood Updated:  09/22/17 0915     WBC 9.10 10*3/mm3      RBC 4.47 10*6/mm3      Hemoglobin 12.9 g/dL      Hematocrit 38.4 %      MCV 85.9 fL      MCH 28.9 pg      MCHC 33.6 g/dL      RDW 13.2 %      RDW-SD 41.0 fl      MPV 10.1 fL      Platelets 227 10*3/mm3      Neutrophil % 57.9 %      Lymphocyte % 29.6 %      Monocyte % 10.4 (H) %      Eosinophil % 1.5 %      Basophil % 0.4 %      Immature Grans % 0.2 %      Neutrophils, Absolute 5.26 10*3/mm3      Lymphocytes, Absolute 2.69 10*3/mm3      Monocytes, Absolute 0.95 10*3/mm3      Eosinophils, Absolute 0.14 10*3/mm3      Basophils, Absolute 0.04 10*3/mm3      Immature Grans, Absolute 0.02 10*3/mm3      nRBC 0.0 /100 WBC     Protime-INR [[id number]]  (Normal) Collected:  09/22/17 0910    Specimen:  Blood Updated:  09/22/17 0952     Protime 13.4 Seconds      INR 1.02    Narrative:       Therapeutic Ranges  for INR: 2.0-3.0 (PT 20-30)                              2.5-3.5 (PT 25-34)    aPTT [[id number]]  (Abnormal) Collected:  09/22/17 0910    Specimen:  Blood Updated:  09/22/17 0952     PTT 43.5 (H) seconds     Narrative:       PTT = The equivalent PTT values for the therapeutic range of heparin levels at 0.1 to 0.7 U/ml are 53 to 110 seconds.    TSH [[id number]]  (Normal) Collected:  09/22/17 0910    Specimen:  Blood Updated:  09/22/17 1005     TSH 2.350 mIU/mL         Prescribed on discharge             Medication List      Stop          JANUVIA 50 MG tablet   Generic drug:  SITagliptin       LUMIGAN 0.01 % ophthalmic drops   Generic drug:  bimatoprost           All lab results, imaging results and other tests were reviewed by Jacques Tomas MD and unless otherwise specified were found to be unremarkable.    Final diagnoses:   Symptomatic bradycardia   Low magnesium levels   Hyponatremia            Jacques Tomas MD  09/22/17 1159       Jacques Tomas MD  09/22/17 1322

## 2017-09-23 PROBLEM — R00.1 SYMPTOMATIC BRADYCARDIA: Status: ACTIVE | Noted: 2017-09-23

## 2017-09-23 LAB
ANION GAP SERPL CALCULATED.3IONS-SCNC: 14.4 MMOL/L
BUN BLD-MCNC: 23 MG/DL (ref 8–23)
BUN/CREAT SERPL: 24.2 (ref 7–25)
CALCIUM SPEC-SCNC: 9.5 MG/DL (ref 8.6–10.5)
CHLORIDE SERPL-SCNC: 101 MMOL/L (ref 98–107)
CO2 SERPL-SCNC: 23.6 MMOL/L (ref 22–29)
CREAT BLD-MCNC: 0.95 MG/DL (ref 0.57–1)
GFR SERPL CREATININE-BSD FRML MDRD: 57 ML/MIN/1.73
GLUCOSE BLD-MCNC: 110 MG/DL (ref 65–99)
GLUCOSE BLDC GLUCOMTR-MCNC: 100 MG/DL (ref 70–130)
GLUCOSE BLDC GLUCOMTR-MCNC: 113 MG/DL (ref 70–130)
GLUCOSE BLDC GLUCOMTR-MCNC: 90 MG/DL (ref 70–130)
GLUCOSE BLDC GLUCOMTR-MCNC: 95 MG/DL (ref 70–130)
POTASSIUM BLD-SCNC: 3.6 MMOL/L (ref 3.5–5.2)
SODIUM BLD-SCNC: 139 MMOL/L (ref 136–145)

## 2017-09-23 PROCEDURE — 99232 SBSQ HOSP IP/OBS MODERATE 35: CPT | Performed by: INTERNAL MEDICINE

## 2017-09-23 PROCEDURE — 80048 BASIC METABOLIC PNL TOTAL CA: CPT | Performed by: INTERNAL MEDICINE

## 2017-09-23 PROCEDURE — 82962 GLUCOSE BLOOD TEST: CPT

## 2017-09-23 RX ORDER — GLIPIZIDE 5 MG/1
2.5 TABLET ORAL EVERY EVENING
Status: DISCONTINUED | OUTPATIENT
Start: 2017-09-23 | End: 2017-09-25 | Stop reason: HOSPADM

## 2017-09-23 RX ORDER — GLIMEPIRIDE 1 MG/1
1 TABLET ORAL
Status: DISCONTINUED | OUTPATIENT
Start: 2017-09-23 | End: 2017-09-23 | Stop reason: CLARIF

## 2017-09-23 RX ORDER — GLIPIZIDE 5 MG/1
2.5 TABLET ORAL
Status: DISCONTINUED | OUTPATIENT
Start: 2017-09-23 | End: 2017-09-23

## 2017-09-23 RX ADMIN — METFORMIN HYDROCHLORIDE 500 MG: 500 TABLET ORAL at 18:24

## 2017-09-23 RX ADMIN — FERROUS SULFATE TAB 325 MG (65 MG ELEMENTAL FE) 325 MG: 325 (65 FE) TAB at 08:01

## 2017-09-23 RX ADMIN — GLIPIZIDE 2.5 MG: 5 TABLET ORAL at 21:12

## 2017-09-23 RX ADMIN — METFORMIN HYDROCHLORIDE 500 MG: 500 TABLET ORAL at 08:01

## 2017-09-23 RX ADMIN — CYANOCOBALAMIN TAB 500 MCG 1000 MCG: 500 TAB at 08:03

## 2017-09-24 LAB
ANION GAP SERPL CALCULATED.3IONS-SCNC: 16.5 MMOL/L
BUN BLD-MCNC: 17 MG/DL (ref 8–23)
BUN/CREAT SERPL: 18.7 (ref 7–25)
CALCIUM SPEC-SCNC: 9.2 MG/DL (ref 8.6–10.5)
CHLORIDE SERPL-SCNC: 102 MMOL/L (ref 98–107)
CO2 SERPL-SCNC: 21.5 MMOL/L (ref 22–29)
CREAT BLD-MCNC: 0.91 MG/DL (ref 0.57–1)
GFR SERPL CREATININE-BSD FRML MDRD: 60 ML/MIN/1.73
GLUCOSE BLD-MCNC: 76 MG/DL (ref 65–99)
GLUCOSE BLDC GLUCOMTR-MCNC: 101 MG/DL (ref 70–130)
GLUCOSE BLDC GLUCOMTR-MCNC: 104 MG/DL (ref 70–130)
GLUCOSE BLDC GLUCOMTR-MCNC: 119 MG/DL (ref 70–130)
GLUCOSE BLDC GLUCOMTR-MCNC: 138 MG/DL (ref 70–130)
POTASSIUM BLD-SCNC: 3.8 MMOL/L (ref 3.5–5.2)
SODIUM BLD-SCNC: 140 MMOL/L (ref 136–145)

## 2017-09-24 PROCEDURE — 99024 POSTOP FOLLOW-UP VISIT: CPT | Performed by: INTERNAL MEDICINE

## 2017-09-24 PROCEDURE — 82962 GLUCOSE BLOOD TEST: CPT

## 2017-09-24 PROCEDURE — 80048 BASIC METABOLIC PNL TOTAL CA: CPT | Performed by: INTERNAL MEDICINE

## 2017-09-24 RX ORDER — CEFAZOLIN SODIUM 2 G/100ML
2 INJECTION, SOLUTION INTRAVENOUS ONCE
Status: DISCONTINUED | OUTPATIENT
Start: 2017-09-25 | End: 2017-09-24

## 2017-09-24 RX ORDER — CEFAZOLIN SODIUM 2 G/100ML
2 INJECTION, SOLUTION INTRAVENOUS ONCE
Status: DISCONTINUED | OUTPATIENT
Start: 2017-09-25 | End: 2017-09-25

## 2017-09-24 RX ADMIN — FERROUS SULFATE TAB 325 MG (65 MG ELEMENTAL FE) 325 MG: 325 (65 FE) TAB at 08:34

## 2017-09-24 RX ADMIN — GLIPIZIDE 2.5 MG: 5 TABLET ORAL at 21:14

## 2017-09-24 RX ADMIN — METFORMIN HYDROCHLORIDE 500 MG: 500 TABLET ORAL at 08:35

## 2017-09-24 RX ADMIN — METFORMIN HYDROCHLORIDE 500 MG: 500 TABLET ORAL at 17:00

## 2017-09-24 RX ADMIN — CYANOCOBALAMIN TAB 500 MCG 1000 MCG: 500 TAB at 08:34

## 2017-09-25 VITALS
DIASTOLIC BLOOD PRESSURE: 72 MMHG | HEIGHT: 60 IN | WEIGHT: 113.4 LBS | RESPIRATION RATE: 18 BRPM | OXYGEN SATURATION: 97 % | BODY MASS INDEX: 22.26 KG/M2 | HEART RATE: 66 BPM | TEMPERATURE: 97.8 F | SYSTOLIC BLOOD PRESSURE: 165 MMHG

## 2017-09-25 LAB
GLUCOSE BLDC GLUCOMTR-MCNC: 102 MG/DL (ref 70–130)
GLUCOSE BLDC GLUCOMTR-MCNC: 113 MG/DL (ref 70–130)
GLUCOSE BLDC GLUCOMTR-MCNC: 130 MG/DL (ref 70–130)
GLUCOSE BLDC GLUCOMTR-MCNC: 97 MG/DL (ref 70–130)

## 2017-09-25 PROCEDURE — 33208 INSRT HEART PM ATRIAL & VENT: CPT | Performed by: INTERNAL MEDICINE

## 2017-09-25 PROCEDURE — 25010000002 MIDAZOLAM PER 1 MG: Performed by: INTERNAL MEDICINE

## 2017-09-25 PROCEDURE — C1785 PMKR, DUAL, RATE-RESP: HCPCS

## 2017-09-25 PROCEDURE — 02HK3JZ INSERTION OF PACEMAKER LEAD INTO RIGHT VENTRICLE, PERCUTANEOUS APPROACH: ICD-10-PCS | Performed by: INTERNAL MEDICINE

## 2017-09-25 PROCEDURE — C1894 INTRO/SHEATH, NON-LASER: HCPCS | Performed by: INTERNAL MEDICINE

## 2017-09-25 PROCEDURE — 0JH606Z INSERTION OF PACEMAKER, DUAL CHAMBER INTO CHEST SUBCUTANEOUS TISSUE AND FASCIA, OPEN APPROACH: ICD-10-PCS | Performed by: INTERNAL MEDICINE

## 2017-09-25 PROCEDURE — 25010000002 FENTANYL CITRATE (PF) 100 MCG/2ML SOLUTION: Performed by: INTERNAL MEDICINE

## 2017-09-25 PROCEDURE — 02H63JZ INSERTION OF PACEMAKER LEAD INTO RIGHT ATRIUM, PERCUTANEOUS APPROACH: ICD-10-PCS | Performed by: INTERNAL MEDICINE

## 2017-09-25 PROCEDURE — C1898 LEAD, PMKR, OTHER THAN TRANS: HCPCS | Performed by: INTERNAL MEDICINE

## 2017-09-25 PROCEDURE — 99152 MOD SED SAME PHYS/QHP 5/>YRS: CPT | Performed by: INTERNAL MEDICINE

## 2017-09-25 PROCEDURE — C1898 LEAD, PMKR, OTHER THAN TRANS: HCPCS

## 2017-09-25 PROCEDURE — 25010000003 CEFAZOLIN IN D5W 1 GM/50ML SOLUTION: Performed by: INTERNAL MEDICINE

## 2017-09-25 PROCEDURE — 99024 POSTOP FOLLOW-UP VISIT: CPT | Performed by: INTERNAL MEDICINE

## 2017-09-25 PROCEDURE — C1785 PMKR, DUAL, RATE-RESP: HCPCS | Performed by: INTERNAL MEDICINE

## 2017-09-25 PROCEDURE — 82962 GLUCOSE BLOOD TEST: CPT

## 2017-09-25 PROCEDURE — 99153 MOD SED SAME PHYS/QHP EA: CPT | Performed by: INTERNAL MEDICINE

## 2017-09-25 DEVICE — PACE/SENSE LEAD
Type: IMPLANTABLE DEVICE | Status: FUNCTIONAL
Brand: INGEVITY™ MRI

## 2017-09-25 DEVICE — PACEMAKER
Type: IMPLANTABLE DEVICE | Status: FUNCTIONAL
Brand: ACCOLADE™ MRI DR

## 2017-09-25 RX ORDER — FENTANYL CITRATE 50 UG/ML
INJECTION, SOLUTION INTRAMUSCULAR; INTRAVENOUS AS NEEDED
Status: DISCONTINUED | OUTPATIENT
Start: 2017-09-25 | End: 2017-09-25 | Stop reason: HOSPADM

## 2017-09-25 RX ORDER — SODIUM CHLORIDE 9 MG/ML
INJECTION, SOLUTION INTRAVENOUS CONTINUOUS PRN
Status: DISCONTINUED | OUTPATIENT
Start: 2017-09-25 | End: 2017-09-25 | Stop reason: HOSPADM

## 2017-09-25 RX ORDER — SODIUM CHLORIDE 0.9 % (FLUSH) 0.9 %
1-10 SYRINGE (ML) INJECTION AS NEEDED
Status: DISCONTINUED | OUTPATIENT
Start: 2017-09-25 | End: 2017-09-25 | Stop reason: HOSPADM

## 2017-09-25 RX ORDER — OXYCODONE HYDROCHLORIDE AND ACETAMINOPHEN 5; 325 MG/1; MG/1
1 TABLET ORAL EVERY 4 HOURS PRN
Status: DISCONTINUED | OUTPATIENT
Start: 2017-09-25 | End: 2017-09-25 | Stop reason: HOSPADM

## 2017-09-25 RX ORDER — OXYCODONE HYDROCHLORIDE AND ACETAMINOPHEN 5; 325 MG/1; MG/1
1 TABLET ORAL EVERY 6 HOURS PRN
Qty: 10 TABLET | Refills: 0 | Status: SHIPPED | OUTPATIENT
Start: 2017-09-25 | End: 2017-09-25 | Stop reason: HOSPADM

## 2017-09-25 RX ORDER — MIDAZOLAM HYDROCHLORIDE 1 MG/ML
INJECTION INTRAMUSCULAR; INTRAVENOUS AS NEEDED
Status: DISCONTINUED | OUTPATIENT
Start: 2017-09-25 | End: 2017-09-25 | Stop reason: HOSPADM

## 2017-09-25 RX ORDER — OXYCODONE HYDROCHLORIDE AND ACETAMINOPHEN 5; 325 MG/1; MG/1
1 TABLET ORAL EVERY 6 HOURS PRN
Qty: 10 TABLET | Refills: 0 | Status: SHIPPED | OUTPATIENT
Start: 2017-09-25 | End: 2017-09-25

## 2017-09-25 RX ORDER — OXYCODONE HYDROCHLORIDE AND ACETAMINOPHEN 5; 325 MG/1; MG/1
1 TABLET ORAL EVERY 4 HOURS PRN
Start: 2017-09-25 | End: 2017-09-25 | Stop reason: HOSPADM

## 2017-09-25 RX ORDER — LIDOCAINE HYDROCHLORIDE 10 MG/ML
INJECTION, SOLUTION INFILTRATION; PERINEURAL AS NEEDED
Status: DISCONTINUED | OUTPATIENT
Start: 2017-09-25 | End: 2017-09-25 | Stop reason: HOSPADM

## 2017-09-25 RX ADMIN — CEFAZOLIN SODIUM 1 G: 1 INJECTION, SOLUTION INTRAVENOUS at 14:46

## 2017-10-05 ENCOUNTER — CLINICAL SUPPORT NO REQUIREMENTS (OUTPATIENT)
Dept: CARDIOLOGY | Facility: CLINIC | Age: 77
End: 2017-10-05

## 2017-10-05 DIAGNOSIS — I49.5 SINOATRIAL NODE DYSFUNCTION (HCC): Primary | ICD-10-CM

## 2017-10-05 PROCEDURE — 93280 PM DEVICE PROGR EVAL DUAL: CPT | Performed by: INTERNAL MEDICINE

## 2017-11-28 ENCOUNTER — OFFICE VISIT (OUTPATIENT)
Dept: CARDIOLOGY | Facility: CLINIC | Age: 77
End: 2017-11-28

## 2017-11-28 VITALS
SYSTOLIC BLOOD PRESSURE: 148 MMHG | BODY MASS INDEX: 22.46 KG/M2 | WEIGHT: 114.4 LBS | HEIGHT: 60 IN | HEART RATE: 74 BPM | DIASTOLIC BLOOD PRESSURE: 64 MMHG

## 2017-11-28 DIAGNOSIS — I49.5 SICK SINUS SYNDROME (HCC): Primary | ICD-10-CM

## 2017-11-28 DIAGNOSIS — I10 ESSENTIAL HYPERTENSION: ICD-10-CM

## 2017-11-28 PROCEDURE — 99213 OFFICE O/P EST LOW 20 MIN: CPT | Performed by: INTERNAL MEDICINE

## 2017-11-28 RX ORDER — CETIRIZINE HYDROCHLORIDE 10 MG/1
10 TABLET ORAL DAILY
COMMUNITY
End: 2023-01-03 | Stop reason: ALTCHOICE

## 2017-11-28 NOTE — PROGRESS NOTES
Date of Office Visit: 2017  Encounter Provider: Philippe De Anda MD  Place of Service: [facility] CARDIOLOGY  Patient Name: Aida Vallejo  :1940    Chief Complaint   Patient presents with   • Hypertension     follow up   :     HPI: Aida Vallejo is a 77 y.o. female who presents today to follow up.      I initially saw her in 2014 for hypertension.  At that time, she had been on losartan and amlodipine but developed low blood pressure so her medications were adjusted.  She also noted periods where it felt like her heart rate dropped to the 50's and was irregular.  A Holter showed NSR with occasional sinus bradycardia, and rare periods of junctional escape (average rate in the 40's).      In 2017, she was re-evaluated for similar symptoms.  A repeat Holter was similar to the previous one; it did show some pauses up to 3.6 seconds.  She was asymptomatic during the monitored period.  She then presented with persistent bradycardia (to the 30s) and was in a junctional rhythm.  She underwent uncomplicated implantation of a [employer] dual-chambered pacemaker.     A renal artery duplex was checked and was normal.  She checks her BP regularly and her SBP is usually ~130 mm Hg at home. She denies syncope, edema, orthopnea, PND, dyspnea, or chest pain.    Past Medical History:   Diagnosis Date   • Diabetes mellitus, type 2    • Glaucoma    • Hypertension    • Sick sinus syndrome     s/p BoSci DCPPM 2017       Past Surgical History:   Procedure Laterality Date   • CARDIAC ELECTROPHYSIOLOGY PROCEDURE N/A 2017    Procedure: Pacemaker DC new [place] ;  Surgeon: Prateek Chin MD;  Location: [facility] INVASIVE LOCATION;  Service:    • EYE SURGERY Bilateral     cataracts   • RETINAL LASER PROCEDURE         Social History     Social History   • Marital status:      Spouse name: N/A   • Number of children: N/A   • Years of education: N/A     Occupational  History   • Not on file.     Social History Main Topics   • Smoking status: Never Smoker   • Smokeless tobacco: Never Used      Comment: caffeine use: 2 -3 cups of coffee daily.   • Alcohol use No   • Drug use: Defer   • Sexual activity: Defer     Other Topics Concern   • Not on file     Social History Narrative       History reviewed. No pertinent family history.    Review of Systems   Constitution: Negative for malaise/fatigue.   Eyes: Negative for blurred vision.   Cardiovascular: Negative for chest pain, orthopnea, palpitations and paroxysmal nocturnal dyspnea.   Respiratory: Negative for shortness of breath.    Musculoskeletal: Negative for neck pain.   Neurological: Negative for headaches.   All other systems reviewed and are negative.      No Known Allergies      Current Outpatient Prescriptions:   •  amLODIPine (NORVASC) 5 MG tablet, Take 2.5 mg by mouth 2 (Two) Times a Day., Disp: , Rfl:   •  aspirin 81 MG chewable tablet, Chew 81 mg Daily., Disp: , Rfl:   •  CALCIUM PO, Take 600 mg by mouth Daily., Disp: , Rfl:   •  cetirizine (zyrTEC) 10 MG tablet, Take 10 mg by mouth Daily. Seasonal, Disp: , Rfl:   •  Cholecalciferol (VITAMIN D PO), Take 2,000 Units by mouth., Disp: , Rfl:   •  CINNAMON PO, Take 1,000 mg by mouth Daily., Disp: , Rfl:   •  Coenzyme Q10 (CO Q 10 PO), Take 300 mg by mouth Daily., Disp: , Rfl:   •  dorzolamide-timolol (COSOPT) 22.3-6.8 MG/ML ophthalmic solution, Administer 1 drop to both eyes 2 (Two) Times a Day., Disp: , Rfl:   •  ferrous sulfate 325 (65 FE) MG tablet, Take 325 mg by mouth Daily With Breakfast., Disp: , Rfl:   •  glimepiride (AMARYL) 1 MG tablet, Take 1 mg by mouth every night at bedtime., Disp: , Rfl:   •  metFORMIN (GLUCOPHAGE) 500 MG tablet, Take 500 mg by mouth 2 (Two) Times a Day With Meals., Disp: , Rfl:   •  Multiple Vitamins-Minerals (MULTIVITAMIN WITH MINERALS) tablet tablet, Take 1 tablet by mouth Daily., Disp: , Rfl:   •  Omega-3 Fatty Acids (FISH OIL) 1000 MG  "capsule capsule, Take  by mouth Daily With Breakfast., Disp: , Rfl:   •  vitamin B-12 (CYANOCOBALAMIN) 1000 MCG tablet, Take 1,000 mcg by mouth Daily., Disp: , Rfl:      Objective:     Vitals:    11/28/17 1435   BP: 148/64   BP Location: Right arm   Pulse: 74   Weight: 114 lb 6.4 oz (51.9 kg)   Height: 60\" (152.4 cm)     Body mass index is 22.34 kg/(m^2).    Physical Exam   Constitutional: She is oriented to person, place, and time. She appears well-developed and well-nourished.   HENT:   Head: Normocephalic.   Nose: Nose normal.   Mouth/Throat: Oropharynx is clear and moist.   Eyes: Conjunctivae and EOM are normal. Pupils are equal, round, and reactive to light.   Neck: Normal range of motion. No JVD present.   Cardiovascular: Normal rate, regular rhythm, normal heart sounds and intact distal pulses.    No murmur heard.  Pulmonary/Chest: Effort normal and breath sounds normal.   Abdominal: Soft. She exhibits no mass. There is no tenderness.   Musculoskeletal: Normal range of motion. She exhibits no edema.   Lymphadenopathy:     She has no cervical adenopathy.   Neurological: She is alert and oriented to person, place, and time. No cranial nerve deficit.   Skin: Skin is warm and dry. No rash noted.   Psychiatric: She has a normal mood and affect. Her behavior is normal. Judgment and thought content normal.   Vitals reviewed.      Procedures      Assessment:       Diagnosis Plan   1. Sick sinus syndrome     2. Essential hypertension            Plan:       1.  She has SSS and is now s/p PPM and is doing much better.  We follow this in device clinic.    2.  Her average SBP at home is 130 mm Hg, which is very good for her.  She states that she feels very poor when it's consistently lower than this.    Sincerely,       Philippe De Anda MD                "

## 2017-12-13 ENCOUNTER — CLINICAL SUPPORT NO REQUIREMENTS (OUTPATIENT)
Dept: CARDIOLOGY | Facility: CLINIC | Age: 77
End: 2017-12-13

## 2017-12-13 DIAGNOSIS — I49.5 SICK SINUS SYNDROME (HCC): Primary | ICD-10-CM

## 2018-01-30 ENCOUNTER — CLINICAL SUPPORT NO REQUIREMENTS (OUTPATIENT)
Dept: CARDIOLOGY | Facility: CLINIC | Age: 78
End: 2018-01-30

## 2018-01-30 DIAGNOSIS — I49.5 SICK SINUS SYNDROME (HCC): Primary | ICD-10-CM

## 2018-01-30 PROCEDURE — 93280 PM DEVICE PROGR EVAL DUAL: CPT | Performed by: INTERNAL MEDICINE

## 2018-03-02 ENCOUNTER — PATIENT MESSAGE (OUTPATIENT)
Dept: CARDIOLOGY | Facility: CLINIC | Age: 78
End: 2018-03-02

## 2018-03-05 ENCOUNTER — TELEPHONE (OUTPATIENT)
Dept: CARDIOLOGY | Facility: CLINIC | Age: 78
End: 2018-03-05

## 2018-03-05 NOTE — TELEPHONE ENCOUNTER
----- Message from Aida Vallejo sent at 3/2/2018  1:59 PM EST -----  Regarding: Non-Urgent Medical Question  Contact: [phone]  On Jan 30, 2018, Dr De Anda said to let her know if my BP entered the 140's range and stayed there for at least a week and it has for the last 2 weeks--even into 150 range a few times. Currently taking Lisinopril 5 mg daily and Amlodipine 5mg divided into 2 doses daily. If I am to continue the same meds, I need a new prescription for Amlodipine--the one from Dr. Coyne doesn't have any refills left. I was using [facility] mail order, but any new orders need to go to [facility] in [place] ([phone]). I have 10 days of Amlodipine left.If I don't get a respose by this method, I will call the office early next week. Thank You

## 2018-03-06 RX ORDER — LISINOPRIL 5 MG/1
5 TABLET ORAL DAILY
COMMUNITY
Start: 2017-12-08 | End: 2018-12-08

## 2018-03-06 RX ORDER — AMLODIPINE BESYLATE 5 MG/1
TABLET ORAL
Qty: 90 TABLET | Refills: 1 | Status: SHIPPED | OUTPATIENT
Start: 2018-03-06 | End: 2018-09-06 | Stop reason: SDUPTHER

## 2018-03-06 NOTE — TELEPHONE ENCOUNTER
"Current Outpatient Prescriptions:   •  amLODIPine (NORVASC) 5 MG tablet, Take 2.5 mg by mouth 2 (Two) Times a Day., Disp: , Rfl:   •  aspirin 81 MG chewable tablet, Chew 81 mg Daily., Disp: , Rfl:   •  CALCIUM PO, Take 600 mg by mouth Daily., Disp: , Rfl:   •  cetirizine (zyrTEC) 10 MG tablet, Take 10 mg by mouth Daily. Seasonal, Disp: , Rfl:   •  Cholecalciferol (VITAMIN D PO), Take 2,000 Units by mouth., Disp: , Rfl:   •  CINNAMON PO, Take 1,000 mg by mouth Daily., Disp: , Rfl:   •  Coenzyme Q10 (CO Q 10 PO), Take 300 mg by mouth Daily., Disp: , Rfl:   •  dorzolamide-timolol (COSOPT) 22.3-6.8 MG/ML ophthalmic solution, Administer 1 drop to both eyes 2 (Two) Times a Day., Disp: , Rfl:   •  ferrous sulfate 325 (65 FE) MG tablet, Take 325 mg by mouth Daily With Breakfast., Disp: , Rfl:   •  glimepiride (AMARYL) 1 MG tablet, Take 1 mg by mouth every night at bedtime., Disp: , Rfl:   •  metFORMIN (GLUCOPHAGE) 500 MG tablet, Take 500 mg by mouth 2 (Two) Times a Day With Meals., Disp: , Rfl:   •  Multiple Vitamins-Minerals (MULTIVITAMIN WITH MINERALS) tablet tablet, Take 1 tablet by mouth Daily., Disp: , Rfl:   •  Omega-3 Fatty Acids (FISH OIL) 1000 MG capsule capsule, Take  by mouth Daily With Breakfast., Disp: , Rfl:   •  vitamin B-12 (CYANOCOBALAMIN) 1000 MCG tablet, Take 1,000 mcg by mouth Daily., Disp: , Rfl:       Objective:       Vitals        Vitals:     11/28/17 1435   BP: 148/64   BP Location: Right arm   Pulse: 74   Weight: 114 lb 6.4 oz (51.9 kg)   Height: 60\" (152.4 cm)         Body mass index is 22.34 kg/(m^2).     Physical Exam   Constitutional: She is oriented to person, place, and time. She appears well-developed and well-nourished.   HENT:   Head: Normocephalic.   Nose: Nose normal.   Mouth/Throat: Oropharynx is clear and moist.   Eyes: Conjunctivae and EOM are normal. Pupils are equal, round, and reactive to light.   Neck: Normal range of motion. No JVD present.   Cardiovascular: Normal rate, regular " rhythm, normal heart sounds and intact distal pulses.    No murmur heard.  Pulmonary/Chest: Effort normal and breath sounds normal.   Abdominal: Soft. She exhibits no mass. There is no tenderness.   Musculoskeletal: Normal range of motion. She exhibits no edema.   Lymphadenopathy:     She has no cervical adenopathy.   Neurological: She is alert and oriented to person, place, and time. No cranial nerve deficit.   Skin: Skin is warm and dry. No rash noted.   Psychiatric: She has a normal mood and affect. Her behavior is normal. Judgment and thought content normal.   Vitals reviewed.        Procedures      Assessment:         Diagnosis Plan   1. Sick sinus syndrome      2. Essential hypertension                Plan:       1.  She has SSS and is now s/p PPM and is doing much better.  We follow this in device clinic.     2.  Her average SBP at home is 130 mm Hg, which is very good for her.  She states that she feels very poor when it's consistently lower than this.

## 2018-05-03 ENCOUNTER — CLINICAL SUPPORT NO REQUIREMENTS (OUTPATIENT)
Dept: CARDIOLOGY | Facility: CLINIC | Age: 78
End: 2018-05-03

## 2018-05-03 DIAGNOSIS — I49.5 SICK SINUS SYNDROME (HCC): Primary | ICD-10-CM

## 2018-05-03 PROCEDURE — 93294 REM INTERROG EVL PM/LDLS PM: CPT | Performed by: INTERNAL MEDICINE

## 2018-05-03 PROCEDURE — 93296 REM INTERROG EVL PM/IDS: CPT | Performed by: INTERNAL MEDICINE

## 2018-05-29 ENCOUNTER — OFFICE VISIT (OUTPATIENT)
Dept: CARDIOLOGY | Facility: CLINIC | Age: 78
End: 2018-05-29

## 2018-05-29 VITALS
HEIGHT: 61 IN | WEIGHT: 116 LBS | BODY MASS INDEX: 21.9 KG/M2 | SYSTOLIC BLOOD PRESSURE: 122 MMHG | HEART RATE: 65 BPM | DIASTOLIC BLOOD PRESSURE: 70 MMHG

## 2018-05-29 DIAGNOSIS — I10 ESSENTIAL HYPERTENSION: ICD-10-CM

## 2018-05-29 DIAGNOSIS — Z95.0 PACEMAKER: ICD-10-CM

## 2018-05-29 DIAGNOSIS — I49.5 SICK SINUS SYNDROME (HCC): Primary | ICD-10-CM

## 2018-05-29 PROCEDURE — 99213 OFFICE O/P EST LOW 20 MIN: CPT | Performed by: INTERNAL MEDICINE

## 2018-05-29 NOTE — PROGRESS NOTES
Date of Office Visit: 2018  Encounter Provider: Philippe De Anda MD  Place of Service: [facility] CARDIOLOGY  Patient Name: Aida Vallejo  :1940    Chief Complaint   Patient presents with   • Pacemaker Check   :     HPI: Aida Vallejo is a 77 y.o. female who presents today to follow up.      I initially saw her in 2014 for hypertension.  At that time, she had been on losartan and amlodipine but developed low blood pressure so her medications were adjusted.  She also noted periods where it felt like her heart rate dropped to the 50s and was irregular.  A Holter showed NSR with occasional sinus bradycardia, and rare periods of junctional escape (average rate in the 40s).      In 2017, she was re-evaluated for similar symptoms.  A repeat Holter was similar to the previous one; it did show some pauses up to 3.6 seconds.  She was asymptomatic during the monitored period.  She then presented with persistent bradycardia (to the 30s) and was in a junctional rhythm.  She underwent uncomplicated implantation of a [employer] dual-chambered pacemaker.     A renal artery duplex was checked and was normal.  She checks her BP regularly and her SBP is usually ~130 mm Hg at home. She denies syncope, edema, orthopnea, PND, dyspnea, or chest pain.    Past Medical History:   Diagnosis Date   • Diabetes mellitus, type 2    • Glaucoma    • Hypertension    • Sick sinus syndrome     s/p BoSci DCPPM 2017       Past Surgical History:   Procedure Laterality Date   • CARDIAC ELECTROPHYSIOLOGY PROCEDURE N/A 2017    Procedure: Pacemaker DC new [place] ;  Surgeon: Prateek Chin MD;  Location: [facility] INVASIVE LOCATION;  Service:    • EYE SURGERY Bilateral     cataracts   • RETINAL LASER PROCEDURE         Social History     Social History   • Marital status:      Spouse name: N/A   • Number of children: N/A   • Years of education: N/A     Occupational History   • Not  on file.     Social History Main Topics   • Smoking status: Never Smoker   • Smokeless tobacco: Never Used      Comment: caffeine use: 2 -3 cups of coffee daily.   • Alcohol use No   • Drug use: Unknown   • Sexual activity: Defer     Other Topics Concern   • Not on file     Social History Narrative   • No narrative on file       No family history on file.    Review of Systems   Constitution: Negative for malaise/fatigue.   Eyes: Negative for blurred vision.   Cardiovascular: Negative for chest pain, orthopnea, palpitations and paroxysmal nocturnal dyspnea.   Respiratory: Negative for shortness of breath.    Hematologic/Lymphatic: Bruises/bleeds easily.   Musculoskeletal: Negative for neck pain.   Neurological: Negative for headaches.   All other systems reviewed and are negative.      No Known Allergies      Current Outpatient Prescriptions:   •  amLODIPine (NORVASC) 5 MG tablet, TAKE ONE HALF TABLET BY MOUTH TWICE DAILY, Disp: 90 tablet, Rfl: 1  •  aspirin 81 MG chewable tablet, Chew 81 mg Daily., Disp: , Rfl:   •  CALCIUM PO, Take 600 mg by mouth Daily., Disp: , Rfl:   •  cetirizine (zyrTEC) 10 MG tablet, Take 10 mg by mouth Daily. Seasonal, Disp: , Rfl:   •  Cholecalciferol (VITAMIN D PO), Take 2,000 Units by mouth., Disp: , Rfl:   •  CINNAMON PO, Take 1,000 mg by mouth Daily., Disp: , Rfl:   •  Coenzyme Q10 (CO Q 10 PO), Take 300 mg by mouth Daily., Disp: , Rfl:   •  dorzolamide-timolol (COSOPT) 22.3-6.8 MG/ML ophthalmic solution, Administer 1 drop to both eyes 2 (Two) Times a Day., Disp: , Rfl:   •  ferrous sulfate 325 (65 FE) MG tablet, Take 325 mg by mouth Daily With Breakfast., Disp: , Rfl:   •  glimepiride (AMARYL) 1 MG tablet, Take 1 mg by mouth every night at bedtime., Disp: , Rfl:   •  lisinopril (PRINIVIL,ZESTRIL) 5 MG tablet, Take 5 mg by mouth Daily., Disp: , Rfl:   •  metFORMIN (GLUCOPHAGE) 500 MG tablet, Take 500 mg by mouth 2 (Two) Times a Day With Meals., Disp: , Rfl:   •  Multiple  "Vitamins-Minerals (MULTIVITAMIN WITH MINERALS) tablet tablet, Take 1 tablet by mouth Daily., Disp: , Rfl:   •  Omega-3 Fatty Acids (FISH OIL) 1000 MG capsule capsule, Take  by mouth Daily With Breakfast., Disp: , Rfl:   •  vitamin B-12 (CYANOCOBALAMIN) 1000 MCG tablet, Take 1,000 mcg by mouth Daily., Disp: , Rfl:      Objective:     Vitals:    05/29/18 1353   BP: 122/70   Pulse: 65   Weight: 52.6 kg (116 lb)   Height: 154.9 cm (61\")     Body mass index is 21.92 kg/m².    Physical Exam   Constitutional: She is oriented to person, place, and time. She appears well-developed and well-nourished.   HENT:   Head: Normocephalic.   Nose: Nose normal.   Mouth/Throat: Oropharynx is clear and moist.   Eyes: Conjunctivae and EOM are normal. Pupils are equal, round, and reactive to light.   Neck: Normal range of motion. No JVD present.   Cardiovascular: Normal rate, regular rhythm, normal heart sounds and intact distal pulses.    No murmur heard.  Pulmonary/Chest: Effort normal and breath sounds normal.   Abdominal: Soft. She exhibits no mass. There is no tenderness.   Musculoskeletal: Normal range of motion. She exhibits no edema.   Lymphadenopathy:     She has no cervical adenopathy.   Neurological: She is alert and oriented to person, place, and time. No cranial nerve deficit.   Skin: Skin is warm and dry. No rash noted.   Psychiatric: She has a normal mood and affect. Her behavior is normal. Judgment and thought content normal.   Vitals reviewed.      Procedures      Assessment:       Diagnosis Plan   1. Sick sinus syndrome     2. Pacemaker     3. Essential hypertension            Plan:       1/2.  She has SSS and is now s/p PPM and is doing much better.  We follow this in device clinic.    2.  Her average SBP at home is 130 mm Hg, which is very good for her.      She has some spontaneous bruising.  Since the aspirin is for primary prevention, I let her know that it could be decreased to every-other-day.      Sincerely, "       Philippe De Anda MD

## 2018-09-07 RX ORDER — AMLODIPINE BESYLATE 5 MG/1
TABLET ORAL
Qty: 90 TABLET | Refills: 1 | Status: SHIPPED | OUTPATIENT
Start: 2018-09-07 | End: 2019-03-01 | Stop reason: SDUPTHER

## 2018-10-23 ENCOUNTER — CLINICAL SUPPORT NO REQUIREMENTS (OUTPATIENT)
Dept: CARDIOLOGY | Facility: CLINIC | Age: 78
End: 2018-10-23

## 2018-10-23 DIAGNOSIS — I49.5 SICK SINUS SYNDROME (HCC): Primary | ICD-10-CM

## 2018-10-23 PROCEDURE — 93280 PM DEVICE PROGR EVAL DUAL: CPT | Performed by: INTERNAL MEDICINE

## 2019-01-23 ENCOUNTER — CLINICAL SUPPORT NO REQUIREMENTS (OUTPATIENT)
Dept: CARDIOLOGY | Facility: CLINIC | Age: 79
End: 2019-01-23

## 2019-01-23 DIAGNOSIS — I49.5 SICK SINUS SYNDROME (HCC): Primary | ICD-10-CM

## 2019-01-23 PROCEDURE — 93294 REM INTERROG EVL PM/LDLS PM: CPT | Performed by: INTERNAL MEDICINE

## 2019-01-23 PROCEDURE — 93296 REM INTERROG EVL PM/IDS: CPT | Performed by: INTERNAL MEDICINE

## 2019-03-01 RX ORDER — AMLODIPINE BESYLATE 5 MG/1
TABLET ORAL
Qty: 90 TABLET | Refills: 1 | Status: SHIPPED | OUTPATIENT
Start: 2019-03-01 | End: 2019-08-26 | Stop reason: SDUPTHER

## 2019-08-18 ENCOUNTER — CLINICAL SUPPORT NO REQUIREMENTS (OUTPATIENT)
Dept: CARDIOLOGY | Facility: CLINIC | Age: 79
End: 2019-08-18

## 2019-08-18 DIAGNOSIS — I49.5 SICK SINUS SYNDROME (HCC): Primary | ICD-10-CM

## 2019-08-18 PROCEDURE — 93294 REM INTERROG EVL PM/LDLS PM: CPT | Performed by: INTERNAL MEDICINE

## 2019-08-18 PROCEDURE — 93296 REM INTERROG EVL PM/IDS: CPT | Performed by: INTERNAL MEDICINE

## 2019-08-26 RX ORDER — AMLODIPINE BESYLATE 5 MG/1
TABLET ORAL
Qty: 90 TABLET | Refills: 0 | Status: SHIPPED | OUTPATIENT
Start: 2019-08-26 | End: 2019-12-02 | Stop reason: SDUPTHER

## 2019-10-29 ENCOUNTER — OFFICE VISIT (OUTPATIENT)
Dept: CARDIOLOGY | Facility: CLINIC | Age: 79
End: 2019-10-29

## 2019-10-29 VITALS
HEIGHT: 61 IN | HEART RATE: 61 BPM | BODY MASS INDEX: 21.96 KG/M2 | SYSTOLIC BLOOD PRESSURE: 126 MMHG | WEIGHT: 116.3 LBS | DIASTOLIC BLOOD PRESSURE: 68 MMHG

## 2019-10-29 DIAGNOSIS — I49.5 SICK SINUS SYNDROME (HCC): ICD-10-CM

## 2019-10-29 DIAGNOSIS — I10 ESSENTIAL HYPERTENSION: ICD-10-CM

## 2019-10-29 DIAGNOSIS — Z95.0 PACEMAKER: Primary | ICD-10-CM

## 2019-10-29 PROCEDURE — 93000 ELECTROCARDIOGRAM COMPLETE: CPT | Performed by: INTERNAL MEDICINE

## 2019-10-29 PROCEDURE — 99213 OFFICE O/P EST LOW 20 MIN: CPT | Performed by: INTERNAL MEDICINE

## 2019-10-29 RX ORDER — LISINOPRIL 5 MG/1
TABLET ORAL
COMMUNITY
Start: 2019-07-22

## 2019-10-29 RX ORDER — INFLUENZA VACCINE, ADJUVANTED 15; 15; 15 UG/.5ML; UG/.5ML; UG/.5ML
INJECTION, SUSPENSION INTRAMUSCULAR
Refills: 0 | COMMUNITY
Start: 2019-10-09

## 2019-10-29 RX ORDER — PRAVASTATIN SODIUM 20 MG
TABLET ORAL
COMMUNITY
Start: 2019-05-20

## 2019-10-29 NOTE — PROGRESS NOTES
Date of Office Visit: 10/29/2019  Encounter Provider: Philippe De Anda MD  Place of Service: [facility] CARDIOLOGY  Patient Name: Aida Vallejo  :1940    Chief Complaint   Patient presents with   • Irregular Heart Beat   :     HPI: Aida Vallejo is a 78 y.o. female who presents today to follow up.      I initially saw her in 2014 for hypertension.  At that time, she had been on losartan and amlodipine but developed low blood pressure so her medications were adjusted.  She also noted periods where it felt like her heart rate dropped to the 50s and was irregular.  A Holter showed NSR with occasional sinus bradycardia, and rare periods of junctional escape (average rate in the 40s).      In 2017, she was re-evaluated for similar symptoms.  A repeat Holter was similar to the previous one; it did show some pauses up to 3.6 seconds.  She was asymptomatic during the monitored period.  She then presented with persistent bradycardia (to the 30s) and was in a junctional rhythm.  She underwent uncomplicated implantation of a [employer] dual-chambered pacemaker.     A renal artery duplex was checked and was normal.  She checks her BP regularly and her SBP is usually ~130s mm Hg at home. She denies syncope, edema, orthopnea, PND, dyspnea, or chest pain.    Past Medical History:   Diagnosis Date   • Diabetes mellitus, type 2 (CMS/HCC)    • Glaucoma    • Hypertension    • Sick sinus syndrome (CMS/HCC)     s/p BoSci DCPPM 2017       Past Surgical History:   Procedure Laterality Date   • CARDIAC ELECTROPHYSIOLOGY PROCEDURE N/A 2017    Procedure: Pacemaker DC new [place] ;  Surgeon: Prateek Chin MD;  Location: [facility] INVASIVE LOCATION;  Service:    • EYE SURGERY Bilateral     cataracts   • RETINAL LASER PROCEDURE         Social History     Socioeconomic History   • Marital status:      Spouse name: Not on file   • Number of children: Not on file   • Years  of education: Not on file   • Highest education level: Not on file   Tobacco Use   • Smoking status: Never Smoker   • Smokeless tobacco: Never Used   • Tobacco comment: caffeine use: 2 -3 cups of coffee daily.   Substance and Sexual Activity   • Alcohol use: No   • Drug use: Defer   • Sexual activity: Defer       History reviewed. No pertinent family history.    Review of Systems   Constitution: Negative for malaise/fatigue.   Eyes: Negative for blurred vision.   Cardiovascular: Negative for chest pain, orthopnea, palpitations and paroxysmal nocturnal dyspnea.   Respiratory: Negative for shortness of breath.    Hematologic/Lymphatic: Bruises/bleeds easily.   Musculoskeletal: Negative for neck pain.   Neurological: Negative for headaches.   All other systems reviewed and are negative.      Allergies   Allergen Reactions   • Statins Myalgia     Myalgia         Current Outpatient Medications:   •  amLODIPine (NORVASC) 5 MG tablet, TAKE 1/2 (ONE-HALF) TABLET BY MOUTH TWICE DAILY, Disp: 90 tablet, Rfl: 0  •  aspirin 81 MG chewable tablet, Chew 81 mg. PT REPORTS TAKING 4 X PER WK., Disp: , Rfl:   •  CALCIUM PO, Take 600 mg by mouth Daily., Disp: , Rfl:   •  cetirizine (zyrTEC) 10 MG tablet, Take 10 mg by mouth Daily. Seasonal, Disp: , Rfl:   •  Cholecalciferol (VITAMIN D PO), Take 2,000 Units by mouth., Disp: , Rfl:   •  CINNAMON PO, Take 1,000 mg by mouth Daily., Disp: , Rfl:   •  Coenzyme Q10 (CO Q 10 PO), Take 300 mg by mouth Daily., Disp: , Rfl:   •  dorzolamide-timolol (COSOPT) 22.3-6.8 MG/ML ophthalmic solution, Administer 1 drop to both eyes 2 (Two) Times a Day., Disp: , Rfl:   •  ferrous sulfate 325 (65 FE) MG tablet, Take 325 mg by mouth Daily With Breakfast., Disp: , Rfl:   •  glimepiride (AMARYL) 1 MG tablet, Take 1 mg by mouth every night at bedtime., Disp: , Rfl:   •  lisinopril (PRINIVIL,ZESTRIL) 5 MG tablet, TAKE 1 TABLET BY MOUTH ONCE DAILY, Disp: , Rfl:   •  metFORMIN (GLUCOPHAGE) 500 MG tablet, Take 500  "mg by mouth 2 (Two) Times a Day With Meals., Disp: , Rfl:   •  Multiple Vitamins-Minerals (MULTIVITAMIN WITH MINERALS) tablet tablet, Take 1 tablet by mouth Daily., Disp: , Rfl:   •  Omega-3 Fatty Acids (FISH OIL) 1000 MG capsule capsule, Take  by mouth Daily With Breakfast., Disp: , Rfl:   •  pravastatin (PRAVACHOL) 20 MG tablet, TAKE 1 TABLET BY MOUTH ONCE DAILY AT NIGHT, Disp: , Rfl:   •  vitamin B-12 (CYANOCOBALAMIN) 1000 MCG tablet, Take 1,000 mcg by mouth Daily., Disp: , Rfl:   •  FLUAD 0.5 ML suspension prefilled syringe, ADMINISTER VACCINE PER PHYSICIAN PROTOCOL, Disp: , Rfl: 0     Objective:     Vitals:    10/29/19 1337   BP: 126/68   BP Location: Left arm   Weight: 52.8 kg (116 lb 4.8 oz)   Height: 154.9 cm (61\")     Body mass index is 21.97 kg/m².    Physical Exam   Constitutional: She is oriented to person, place, and time. She appears well-developed and well-nourished.   HENT:   Head: Normocephalic.   Nose: Nose normal.   Mouth/Throat: Oropharynx is clear and moist.   Eyes: Conjunctivae and EOM are normal. Pupils are equal, round, and reactive to light.   Neck: Normal range of motion. No JVD present.   Cardiovascular: Normal rate, regular rhythm, normal heart sounds and intact distal pulses.   No murmur heard.  Pulmonary/Chest: Effort normal and breath sounds normal.   Abdominal: Soft. There is no tenderness.   Musculoskeletal: Normal range of motion. She exhibits no edema.   Lymphadenopathy:     She has no cervical adenopathy.   Neurological: She is alert and oriented to person, place, and time. No cranial nerve deficit.   Skin: Skin is warm and dry. No rash noted.   Psychiatric: She has a normal mood and affect. Her behavior is normal. Judgment and thought content normal.   Vitals reviewed.        ECG 12 Lead  Date/Time: 10/29/2019 1:46 PM  Performed by: Philippe De Anda MD  Authorized by: Philippe De Anda MD   Comparison: compared with previous ECG   Similar to previous ECG  Rhythm: sinus " rhythm  Conduction: conduction normal  ST Segments: ST segments normal  T Waves: T waves normal  QRS axis: normal  Other findings: left atrial abnormality    Clinical impression: non-specific ECG              Assessment:       Diagnosis Plan   1. Pacemaker     2. Sick sinus syndrome (CMS/HCC)     3. Essential hypertension            Plan:       1/2.  She has SSS and is now s/p PPM and is doing much better.  We follow this in device clinic.    3.  Her average SBP at home is 130 mm Hg, which is very good for her.      Sincerely,       Philippe De Anda MD

## 2019-11-15 ENCOUNTER — CLINICAL SUPPORT NO REQUIREMENTS (OUTPATIENT)
Dept: CARDIOLOGY | Facility: CLINIC | Age: 79
End: 2019-11-15

## 2019-11-15 DIAGNOSIS — I49.5 SICK SINUS SYNDROME (HCC): Primary | ICD-10-CM

## 2019-11-15 PROCEDURE — 93280 PM DEVICE PROGR EVAL DUAL: CPT | Performed by: INTERNAL MEDICINE

## 2019-12-03 RX ORDER — AMLODIPINE BESYLATE 5 MG/1
TABLET ORAL
Qty: 90 TABLET | Refills: 1 | Status: SHIPPED | OUTPATIENT
Start: 2019-12-03 | End: 2020-05-31 | Stop reason: SDUPTHER

## 2020-02-20 ENCOUNTER — CLINICAL SUPPORT NO REQUIREMENTS (OUTPATIENT)
Dept: CARDIOLOGY | Facility: CLINIC | Age: 80
End: 2020-02-20

## 2020-02-20 DIAGNOSIS — I49.5 SICK SINUS SYNDROME (HCC): Primary | ICD-10-CM

## 2020-02-20 PROCEDURE — 93294 REM INTERROG EVL PM/LDLS PM: CPT | Performed by: INTERNAL MEDICINE

## 2020-02-20 PROCEDURE — 93296 REM INTERROG EVL PM/IDS: CPT | Performed by: INTERNAL MEDICINE

## 2020-05-29 RX ORDER — AMLODIPINE BESYLATE 5 MG/1
2.5 TABLET ORAL 2 TIMES DAILY
Qty: 90 TABLET | Refills: 1 | Status: CANCELLED | OUTPATIENT
Start: 2020-05-29

## 2020-05-31 RX ORDER — AMLODIPINE BESYLATE 5 MG/1
2.5 TABLET ORAL 2 TIMES DAILY
Qty: 90 TABLET | Refills: 1 | Status: SHIPPED | OUTPATIENT
Start: 2020-05-31 | End: 2020-11-24

## 2020-10-15 NOTE — PROGRESS NOTES
RM:________     PCP: Hoa Sims MD    : 1940  AGE: 79 y.o.  EST PATIENT   REASON FOR VISIT/  CC:        WT: ____________ BP: __________L __________R HR______    CHEST PAIN: _____________    SOA: _____________PALPS: _______________     LIGHTHEADED: ___________FATIGUE: ________________ EDEMA __________    ALLERGIES:Statins SMOKING HISTORY:  Social History     Tobacco Use   • Smoking status: Never Smoker   • Smokeless tobacco: Never Used   • Tobacco comment: caffeine use: 2 -3 cups of coffee daily.   Substance Use Topics   • Alcohol use: No   • Drug use: Defer     CAFFEINE USE_________________  ALCOHOL ______________________    Below is the patient's most recent value for Albumin, ALT, AST, BUN, Calcium, Chloride, Cholesterol, CO2, Creatinine, GFR, Glucose, HDL, Hematocrit, Hemoglobin, Hemoglobin A1C, LDL, Magnesium, Phosphorus, Platelets, Potassium, PSA, Sodium, Triglycerides, TSH and WBC.   Lab Results   Component Value Date    ALBUMIN 4.4 2019    ALT 17 2019    AST 22 2019    BUN 22 (H) 2019    CALCIUM 9.9 2019     2019    CHOL 239 (H) 2016    CO2 23 2019    CREATININE 0.9 2019     (H) 2019    HDL 55 2019    HCT 38.4 2017    HGB 12.9 2017    HGBA1C 5.9 (H) 2019    LDL 83 2019    MG 1.8 2017     2017    K 5.1 2019     2019    TRIG 96 2019    TSH 2.350 2017    WBC 9.10 2017          NEW DIAGNOSIS/ SURGERY/ HOSP OR ED VISITS: ______________________    __________________________________________________________________      RECENT LABS OR DIAGNOSTIC TESTING:  _____________________________    __________________________________________________________________      ASSESSMENT/ PLAN: _______________________________________________    __________________________________________________________________

## 2020-10-27 ENCOUNTER — OFFICE VISIT (OUTPATIENT)
Dept: CARDIOLOGY | Facility: CLINIC | Age: 80
End: 2020-10-27

## 2020-10-27 VITALS
HEIGHT: 61 IN | BODY MASS INDEX: 21.9 KG/M2 | HEART RATE: 65 BPM | DIASTOLIC BLOOD PRESSURE: 78 MMHG | WEIGHT: 116 LBS | SYSTOLIC BLOOD PRESSURE: 170 MMHG

## 2020-10-27 DIAGNOSIS — I49.5 SICK SINUS SYNDROME (HCC): Primary | ICD-10-CM

## 2020-10-27 DIAGNOSIS — Z95.0 PACEMAKER: ICD-10-CM

## 2020-10-27 DIAGNOSIS — I10 ESSENTIAL HYPERTENSION: ICD-10-CM

## 2020-10-27 DIAGNOSIS — I65.23 CAROTID ATHEROSCLEROSIS, BILATERAL: ICD-10-CM

## 2020-10-27 PROCEDURE — 99214 OFFICE O/P EST MOD 30 MIN: CPT | Performed by: INTERNAL MEDICINE

## 2020-10-27 PROCEDURE — 93000 ELECTROCARDIOGRAM COMPLETE: CPT | Performed by: INTERNAL MEDICINE

## 2020-10-27 NOTE — PROGRESS NOTES
Date of Office Visit: 10/27/2020  Encounter Provider: Philippe De Anda MD  Place of Service: [facility] CARDIOLOGY  Patient Name: Aida Vallejo  :1940    Chief Complaint   Patient presents with   • Hypertension   :     HPI: Aida Vallejo is a 79 y.o. female who presents today to follow up.      I initially saw her in 2014 for hypertension.  At that time, she had been on losartan and amlodipine but developed low blood pressure so her medications were adjusted.  She also noted periods where it felt like her heart rate dropped to the 50s and was irregular.  A Holter showed NSR with occasional sinus bradycardia, and rare periods of junctional escape (average rate in the 40s).      In 2017, she was re-evaluated for similar symptoms.  A repeat Holter was similar to the previous one; it did show some pauses up to 3.6 seconds.  She was asymptomatic during the monitored period.  She then presented with persistent bradycardia (to the 30s) and was in a junctional rhythm.  She underwent uncomplicated implantation of a [employer] dual-chambered pacemaker.     She had a carotid doppler in  that reported 50-69% stenosis on the right, and <50% disease on the left.    She checks her BP regularly and her SBP is usually ~130-140s mm Hg at home. She denies syncope, edema, orthopnea, PND, dyspnea, or chest pain.    Past Medical History:   Diagnosis Date   • Carotid atherosclerosis, bilateral    • Diabetes mellitus, type 2 (CMS/HCC)    • Glaucoma    • Hyperlipidemia    • Hypertension    • Polyclonal gammopathy    • Sick sinus syndrome (CMS/HCC)     s/p [employer] DCPPM 2017       Past Surgical History:   Procedure Laterality Date   • CARDIAC ELECTROPHYSIOLOGY PROCEDURE N/A 2017    Procedure: Pacemaker DC new [place] ;  Surgeon: Prateek Chin MD;  Location: [facility] INVASIVE LOCATION;  Service:    • EYE SURGERY Bilateral     cataracts   • RETINAL LASER PROCEDURE          Social History     Socioeconomic History   • Marital status:      Spouse name: Not on file   • Number of children: Not on file   • Years of education: Not on file   • Highest education level: Not on file   Tobacco Use   • Smoking status: Never Smoker   • Smokeless tobacco: Never Used   • Tobacco comment: caffeine use: 2 -3 cups of coffee daily.   Substance and Sexual Activity   • Alcohol use: No   • Drug use: Defer   • Sexual activity: Defer       No family history on file.    Review of Systems   Constitution: Negative for malaise/fatigue.   Eyes: Negative for blurred vision.   Cardiovascular: Negative for chest pain, orthopnea, palpitations and paroxysmal nocturnal dyspnea.   Respiratory: Negative for shortness of breath.    Hematologic/Lymphatic: Bruises/bleeds easily.   Musculoskeletal: Negative for neck pain.   Neurological: Negative for headaches.   All other systems reviewed and are negative.      Allergies   Allergen Reactions   • Statins Myalgia     Myalgia         Current Outpatient Medications:   •  amLODIPine (NORVASC) 5 MG tablet, Take 0.5 tablets by mouth 2 (Two) Times a Day., Disp: 90 tablet, Rfl: 1  •  aspirin 81 MG chewable tablet, Chew 81 mg. PT REPORTS TAKING 4 X PER WK., Disp: , Rfl:   •  CALCIUM PO, Take 600 mg by mouth Daily., Disp: , Rfl:   •  cetirizine (zyrTEC) 10 MG tablet, Take 10 mg by mouth Daily. Seasonal, Disp: , Rfl:   •  Cholecalciferol (VITAMIN D PO), Take 2,000 Units by mouth., Disp: , Rfl:   •  CINNAMON PO, Take 1,000 mg by mouth Daily., Disp: , Rfl:   •  Coenzyme Q10 (CO Q 10 PO), Take 300 mg by mouth Daily., Disp: , Rfl:   •  dorzolamide-timolol (COSOPT) 22.3-6.8 MG/ML ophthalmic solution, Administer 1 drop to both eyes 2 (Two) Times a Day., Disp: , Rfl:   •  ferrous sulfate 325 (65 FE) MG tablet, Take 325 mg by mouth Daily With Breakfast., Disp: , Rfl:   •  FLUAD 0.5 ML suspension prefilled syringe, ADMINISTER VACCINE PER PHYSICIAN PROTOCOL, Disp: , Rfl: 0  •   glimepiride (AMARYL) 1 MG tablet, Take 1 mg by mouth every night at bedtime., Disp: , Rfl:   •  lisinopril (PRINIVIL,ZESTRIL) 5 MG tablet, TAKE 1 TABLET BY MOUTH ONCE DAILY, Disp: , Rfl:   •  metFORMIN (GLUCOPHAGE) 500 MG tablet, Take 500 mg by mouth 2 (Two) Times a Day With Meals., Disp: , Rfl:   •  Multiple Vitamins-Minerals (MULTIVITAMIN WITH MINERALS) tablet tablet, Take 1 tablet by mouth Daily., Disp: , Rfl:   •  Omega-3 Fatty Acids (FISH OIL) 1000 MG capsule capsule, Take  by mouth Daily With Breakfast., Disp: , Rfl:   •  pravastatin (PRAVACHOL) 20 MG tablet, TAKE 1 TABLET BY MOUTH ONCE DAILY AT NIGHT, Disp: , Rfl:   •  vitamin B-12 (CYANOCOBALAMIN) 1000 MCG tablet, Take 1,000 mcg by mouth Daily., Disp: , Rfl:      Objective:     There were no vitals filed for this visit.  There is no height or weight on file to calculate BMI.    Physical Exam   Constitutional: She is oriented to person, place, and time. She appears well-developed and well-nourished.   HENT:   Head: Normocephalic.   Nose: Nose normal.   masked   Eyes: Conjunctivae and EOM are normal.   Neck: Normal range of motion. No JVD present.   Cardiovascular: Normal rate, regular rhythm, normal heart sounds and intact distal pulses.   No murmur heard.  Pulses:       Carotid pulses are on the right side with bruit.  Pulmonary/Chest: Effort normal and breath sounds normal.   Abdominal: Soft. There is no abdominal tenderness.   Musculoskeletal: Normal range of motion.         General: No edema.   Neurological: She is alert and oriented to person, place, and time. No cranial nerve deficit.   Skin: Skin is warm and dry. No rash noted.   Psychiatric: She has a normal mood and affect. Her behavior is normal. Judgment and thought content normal.   Vitals reviewed.        ECG 12 Lead    Date/Time: 10/27/2020 11:44 AM  Performed by: Philippe De Anda MD  Authorized by: Philippe De Anda MD   Comparison: compared with previous ECG   Similar to previous ECG  Rhythm: sinus  rhythm  Conduction: non-specific intraventricular conduction delay  ST Segments: ST segments normal  T Waves: T waves normal  QRS axis: normal  Other: no other findings    Clinical impression: non-specific ECG              Assessment:       Diagnosis Plan   1. Sick sinus syndrome (CMS/HCC)     2. Pacemaker     3. Essential hypertension     4. Carotid atherosclerosis, bilateral            Plan:       1/2.  She has SSS and is now s/p PPM and is doing much better.  We follow this in device clinic.    3.  Her average SBP at home is 130 mm Hg, which is very good for her.  It's always high at the doctors' office.    4.  This has not been re-evaluated since 2016; I'll get a duplex.    Sincerely,       Philippe De Anda MD

## 2020-11-04 ENCOUNTER — HOSPITAL ENCOUNTER (OUTPATIENT)
Dept: ULTRASOUND IMAGING | Facility: HOSPITAL | Age: 80
Discharge: HOME | End: 2020-11-04
Admitting: INTERNAL MEDICINE

## 2020-11-04 ENCOUNTER — TELEPHONE (OUTPATIENT)
Dept: CARDIOLOGY | Facility: CLINIC | Age: 80
End: 2020-11-04

## 2020-11-04 DIAGNOSIS — I65.23 CAROTID ATHEROSCLEROSIS, BILATERAL: ICD-10-CM

## 2020-11-04 PROCEDURE — 93880 EXTRACRANIAL BILAT STUDY: CPT

## 2020-11-04 NOTE — TELEPHONE ENCOUNTER
Pt called back. Went over results. She verbalized understanding.    Thank you,    Rosa Farias, RN  Triage [facility] [place]

## 2020-11-04 NOTE — TELEPHONE ENCOUNTER
----- Message from Philippe De Anda MD sent at 11/4/2020  9:50 AM EST -----  Please let her know she has 50% blockage bilaterally, this has NOT progressed in the four years since we checked the last one, and I will check it again next year.    russell coles

## 2020-11-24 RX ORDER — AMLODIPINE BESYLATE 5 MG/1
TABLET ORAL
Qty: 90 TABLET | Refills: 1 | Status: SHIPPED | OUTPATIENT
Start: 2020-11-24 | End: 2021-05-20

## 2021-02-11 ENCOUNTER — TELEPHONE (OUTPATIENT)
Dept: CARDIOLOGY | Facility: CLINIC | Age: 81
End: 2021-02-11

## 2021-02-11 NOTE — TELEPHONE ENCOUNTER
Pt had a pacemaker scheduled for today 2/11, due to the weather we were going to reschedule the patient to a different date. Pt was admittant about coming in for his pacemaker to be checked and also for lab work.     He is very concerned said that this did not need to be put off since he was having problems.     Is it possible for the patient to have his pacemaker checked remotely if need be and wait for his lab work

## 2021-02-18 PROCEDURE — 93294 REM INTERROG EVL PM/LDLS PM: CPT | Performed by: INTERNAL MEDICINE

## 2021-02-18 PROCEDURE — 93296 REM INTERROG EVL PM/IDS: CPT | Performed by: INTERNAL MEDICINE

## 2021-03-11 ENCOUNTER — CLINICAL SUPPORT NO REQUIREMENTS (OUTPATIENT)
Dept: CARDIOLOGY | Facility: CLINIC | Age: 81
End: 2021-03-11

## 2021-03-11 DIAGNOSIS — I49.5 SSS (SICK SINUS SYNDROME) (HCC): Primary | ICD-10-CM

## 2021-03-11 PROCEDURE — 93280 PM DEVICE PROGR EVAL DUAL: CPT | Performed by: INTERNAL MEDICINE

## 2021-05-20 RX ORDER — AMLODIPINE BESYLATE 5 MG/1
TABLET ORAL
Qty: 90 TABLET | Refills: 2 | Status: SHIPPED | OUTPATIENT
Start: 2021-05-20 | End: 2022-02-17

## 2021-08-19 PROCEDURE — 93296 REM INTERROG EVL PM/IDS: CPT | Performed by: INTERNAL MEDICINE

## 2021-08-19 PROCEDURE — 93294 REM INTERROG EVL PM/LDLS PM: CPT | Performed by: INTERNAL MEDICINE

## 2021-11-02 NOTE — PROGRESS NOTES
RM:________     PCP: Hoa Sims MD    : 1940  AGE: 80 y.o.  EST PATIENT   REASON FOR VISIT/  CC:    BP Readings from Last 3 Encounters:   10/27/20 170/78   10/29/19 126/68   18 122/70        WT: ____________ BP: __________L __________R HR______    CHEST PAIN: _____________    SOA: _____________PALPS: _______________     LIGHTHEADED: ___________FATIGUE: ________________ EDEMA __________    ALLERGIES:Statins SMOKING HISTORY:  Social History     Tobacco Use   • Smoking status: Never Smoker   • Smokeless tobacco: Never Used   • Tobacco comment: caffeine use: 2 -3 cups of coffee daily.   Substance Use Topics   • Alcohol use: No   • Drug use: Defer     CAFFEINE USE_________________  ALCOHOL ______________________    Below is the patient's most recent value for Albumin, ALT, AST, BUN, Calcium, Chloride, Cholesterol, CO2, Creatinine, GFR, Glucose, HDL, Hematocrit, Hemoglobin, Hemoglobin A1C, LDL, Magnesium, Phosphorus, Platelets, Potassium, PSA, Sodium, Triglycerides, TSH and WBC.   Lab Results   Component Value Date    ALBUMIN 4.3 2021    ALT 22 2021    AST 25 2021    BUN 17 2021    CALCIUM 9.7 2021     2021    CHOL 239 (H) 2016    CO2 24 2021    CREATININE 0.80 2021     (H) 2021    HDL 55 2020    HCT 36.7 2021    HGB 11.8 (L) 2021    HGBA1C 6.8 (H) 2021    LDL 88 2020    MG 1.8 2017     2021    K 4.7 2021     2021    TRIG 100 2020    TSH 1.280 2020    WBC 5.96 2021          NEW DIAGNOSIS/ SURGERY/ HOSP OR ED VISITS: ______________________    __________________________________________________________________      RECENT LABS OR DIAGNOSTIC TESTING:  _____________________________    __________________________________________________________________      ASSESSMENT/ PLAN:  _______________________________________________    __________________________________________________________________

## 2021-11-23 ENCOUNTER — OFFICE VISIT (OUTPATIENT)
Dept: CARDIOLOGY | Facility: CLINIC | Age: 81
End: 2021-11-23

## 2021-11-23 VITALS
SYSTOLIC BLOOD PRESSURE: 152 MMHG | DIASTOLIC BLOOD PRESSURE: 68 MMHG | HEART RATE: 67 BPM | BODY MASS INDEX: 21.99 KG/M2 | WEIGHT: 112 LBS | HEIGHT: 60 IN

## 2021-11-23 DIAGNOSIS — I49.5 SICK SINUS SYNDROME (HCC): ICD-10-CM

## 2021-11-23 DIAGNOSIS — Z95.0 PACEMAKER: ICD-10-CM

## 2021-11-23 DIAGNOSIS — I34.0 NONRHEUMATIC MITRAL VALVE REGURGITATION: ICD-10-CM

## 2021-11-23 DIAGNOSIS — I10 PRIMARY HYPERTENSION: ICD-10-CM

## 2021-11-23 DIAGNOSIS — I65.23 CAROTID ATHEROSCLEROSIS, BILATERAL: ICD-10-CM

## 2021-11-23 PROBLEM — R80.9 DIABETES MELLITUS WITH MICROALBUMINURIA (HCC): Status: ACTIVE | Noted: 2017-08-01

## 2021-11-23 PROBLEM — E11.29 DIABETES MELLITUS WITH MICROALBUMINURIA (HCC): Status: ACTIVE | Noted: 2017-08-01

## 2021-11-23 PROBLEM — I65.29 CAROTID ATHEROSCLEROSIS: Status: ACTIVE | Noted: 2017-08-01

## 2021-11-23 PROCEDURE — 93000 ELECTROCARDIOGRAM COMPLETE: CPT | Performed by: INTERNAL MEDICINE

## 2021-11-23 PROCEDURE — 99214 OFFICE O/P EST MOD 30 MIN: CPT | Performed by: INTERNAL MEDICINE

## 2021-11-23 RX ORDER — DEXAMETHASONE 6 MG/1
TABLET ORAL
COMMUNITY
Start: 2021-09-10

## 2021-11-23 RX ORDER — EPINEPHRINE 0.3 MG/.3ML
0.3 INJECTION SUBCUTANEOUS
COMMUNITY
Start: 2021-09-13 | End: 2023-01-03 | Stop reason: ALTCHOICE

## 2021-11-23 RX ORDER — ALBUTEROL SULFATE 90 UG/1
AEROSOL, METERED RESPIRATORY (INHALATION)
COMMUNITY
Start: 2021-09-10

## 2021-11-23 RX ORDER — ZINC SULFATE 50(220)MG
220 CAPSULE ORAL DAILY
COMMUNITY
Start: 2021-09-10

## 2021-11-23 NOTE — PROGRESS NOTES
Date of Office Visit: 2021  Encounter Provider: Philippe De Anda MD  Place of Service: [facility] CARDIOLOGY  Patient Name: Aida Vallejo  :1940    Chief Complaint   Patient presents with   • sick sinus syndrome     1 yr f/u   :     HPI: Aida Vallejo is a 81 y.o. female who presents today to follow up.  I have reviewed prior notes and there are no changes except for any new updates described below. I have also reviewed any information entered into the medical record by the patient or by ancillary staff.     I initially saw her in 2014 for hypertension.  At that time, she had been on losartan and amlodipine but developed low blood pressure so her medications were adjusted.  She also noted periods where it felt like her heart rate dropped to the 50s and was irregular.  A Holter showed NSR with occasional sinus bradycardia, and rare periods of junctional escape (average rate in the 40s).      In 2017, she was re-evaluated for similar symptoms.  A repeat Holter was similar to the previous one; it did show some pauses up to 3.6 seconds.  She was asymptomatic during the monitored period.  She then presented with persistent bradycardia (to the 30s) and was in a junctional rhythm.  She underwent uncomplicated implantation of a [employer] dual-chambered pacemaker.  An echo at that time showed mild-moderate MR.     She had a carotid doppler in  that reported 50-69% stenosis on the right, and <50% disease on the left. In , she had 50-69% disease bilaterally.     She checks her BP regularly and her SBP is usually ~130-140s mm Hg at home. She denies syncope, edema, orthopnea, PND, dyspnea, or chest pain. She's fatigued since she had COVID in September (she is vaccinated).     Past Medical History:   Diagnosis Date   • Carotid atherosclerosis, bilateral    • Diabetes mellitus, type 2 (HCC)    • Glaucoma    • Hyperlipidemia    • Hypertension    • Polyclonal  gammopathy    • Sick sinus syndrome (HCC)     s/p [employer] DCPPM 9/2017       Past Surgical History:   Procedure Laterality Date   • CARDIAC ELECTROPHYSIOLOGY PROCEDURE N/A 9/25/2017    Procedure: Pacemaker DC jonathan [employer]ap[employer] ;  Surgeon: Prateek Chin MD;  Location: [facility] INVASIVE LOCATION;  Service:    • EYE SURGERY Bilateral     cataracts   • RETINAL LASER PROCEDURE         Social History     Socioeconomic History   • Marital status:    Tobacco Use   • Smoking status: Never Smoker   • Smokeless tobacco: Never Used   • Tobacco comment: caffeine use: 2 -3 cups of coffee daily.   Substance and Sexual Activity   • Alcohol use: No   • Drug use: Defer   • Sexual activity: Defer       History reviewed. No pertinent family history.    Review of Systems   Constitutional: Positive for malaise/fatigue.   Eyes: Negative for blurred vision.   Cardiovascular: Negative for chest pain, orthopnea, palpitations and paroxysmal nocturnal dyspnea.   Respiratory: Negative for shortness of breath.    Hematologic/Lymphatic: Bruises/bleeds easily.   Musculoskeletal: Negative for neck pain.   Neurological: Negative for headaches.   All other systems reviewed and are negative.      Allergies   Allergen Reactions   • Statins Myalgia     Myalgia         Current Outpatient Medications:   •  budesonide (PULMICORT) 90 MCG/ACT inhaler, Inhale 1 puff Daily., Disp: , Rfl:   •  EPINEPHrine (EPIPEN) 0.3 MG/0.3ML solution auto-injector injection, Inject 0.3 mg into the appropriate muscle as directed by prescriber., Disp: , Rfl:   •  famotidine (PEPCID) 20 MG/2ML solution injection, Infuse 20 mg into a venous catheter., Disp: , Rfl:   •  zinc sulfate (ZINCATE) 220 (50 Zn) MG capsule, Take 220 mg by mouth Daily., Disp: , Rfl:   •  albuterol sulfate  (90 Base) MCG/ACT inhaler, , Disp: , Rfl:   •  amLODIPine (NORVASC) 5 MG tablet, Take 1/2 (one-half) tablet by mouth twice daily, Disp: 90 tablet, Rfl: 2  •  aspirin 81 MG chewable tablet,  "Chew 81 mg. PT REPORTS TAKING 4 X PER WK., Disp: , Rfl:   •  cetirizine (zyrTEC) 10 MG tablet, Take 10 mg by mouth Daily. Seasonal, Disp: , Rfl:   •  Cholecalciferol (VITAMIN D PO), Take 2,000 Units by mouth., Disp: , Rfl:   •  CINNAMON PO, Take 1,000 mg by mouth Daily., Disp: , Rfl:   •  Coenzyme Q10 (CO Q 10 PO), Take 300 mg by mouth Daily., Disp: , Rfl:   •  cycloSPORINE (RESTASIS OP), Apply  to eye(s) as directed by provider., Disp: , Rfl:   •  dexamethasone (DECADRON) 6 MG tablet, , Disp: , Rfl:   •  dorzolamide-timolol (COSOPT) 22.3-6.8 MG/ML ophthalmic solution, Administer 1 drop to both eyes 2 (Two) Times a Day., Disp: , Rfl:   •  ferrous sulfate 325 (65 FE) MG tablet, Take 325 mg by mouth Daily With Breakfast., Disp: , Rfl:   •  FLUAD 0.5 ML suspension prefilled syringe, ADMINISTER VACCINE PER PHYSICIAN PROTOCOL, Disp: , Rfl: 0  •  glimepiride (AMARYL) 1 MG tablet, Take 1 mg by mouth every night at bedtime., Disp: , Rfl:   •  lisinopril (PRINIVIL,ZESTRIL) 5 MG tablet, TAKE 1 TABLET BY MOUTH ONCE DAILY, Disp: , Rfl:   •  metFORMIN (GLUCOPHAGE) 500 MG tablet, Take 500 mg by mouth 2 (Two) Times a Day With Meals., Disp: , Rfl:   •  Multiple Vitamins-Minerals (MULTIVITAMIN WITH MINERALS) tablet tablet, Take 1 tablet by mouth Daily., Disp: , Rfl:   •  Omega-3 Fatty Acids (FISH OIL) 1000 MG capsule capsule, Take  by mouth Daily With Breakfast., Disp: , Rfl:   •  pravastatin (PRAVACHOL) 20 MG tablet, TAKE 1 TABLET BY MOUTH ONCE DAILY AT NIGHT, Disp: , Rfl:   •  vitamin B-12 (CYANOCOBALAMIN) 1000 MCG tablet, Take 1,000 mcg by mouth Daily., Disp: , Rfl:      Objective:     Vitals:    11/23/21 1053   BP: 152/68   Pulse: 67   Weight: 50.8 kg (112 lb)   Height: 152.4 cm (60\")     Body mass index is 21.87 kg/m².    Physical Exam  Vitals reviewed.   Constitutional:       Appearance: She is well-developed.   HENT:      Head: Normocephalic.      Nose: Nose normal.      Mouth/Throat:      Comments: Masked  Eyes:      " Conjunctiva/sclera: Conjunctivae normal.   Neck:      Vascular: No JVD.   Cardiovascular:      Rate and Rhythm: Normal rate and regular rhythm.      Pulses: Normal pulses and intact distal pulses.           Carotid pulses are on the right side with bruit.     Heart sounds: Normal heart sounds. No murmur heard.      Pulmonary:      Effort: Pulmonary effort is normal.      Breath sounds: Normal breath sounds.   Abdominal:      Palpations: Abdomen is soft.      Tenderness: There is no abdominal tenderness.   Musculoskeletal:         General: No swelling. Normal range of motion.      Cervical back: Normal range of motion.   Skin:     General: Skin is warm and dry.      Findings: No rash.   Neurological:      General: No focal deficit present.      Mental Status: She is alert and oriented to person, place, and time.      Cranial Nerves: No cranial nerve deficit.   Psychiatric:         Mood and Affect: Mood normal.         Behavior: Behavior normal.         Thought Content: Thought content normal.           ECG 12 Lead    Date/Time: 11/23/2021 11:23 AM  Performed by: Philippe De Anda MD  Authorized by: Philippe De Anda MD   Comparison: compared with previous ECG   Similar to previous ECG  Rhythm: sinus rhythm  Conduction: conduction normal  ST Segments: ST segments normal  T Waves: T waves normal  QRS axis: normal  Other: no other findings    Clinical impression: normal ECG              Assessment:       Diagnosis Plan   1. Sick sinus syndrome (HCC)     2. Pacemaker     3. Primary hypertension     4. Carotid atherosclerosis, bilateral  US Carotid Bilateral   5. Nonrheumatic mitral valve regurgitation  Adult Transthoracic Echo Complete W/ Cont if Necessary Per Protocol          Plan:       1/2.  She has SSS and is now s/p PPM and is doing much better.  We follow this in device clinic.    3.  Her average SBP at home is 130 mm Hg, which is very good for her.  It's always high at the doctors' office.    4. In 2020, she had 50-69%  disease bilaterally. I'll repeat a duplex; she would like to wait until January 2022, and I'm fine with that. She's on pravastatin and aspirin.    5. She had mild-moderate MR in 2017; I'll repeat an echo.     Sincerely,       Philippe De Anda MD

## 2022-01-10 ENCOUNTER — HOSPITAL ENCOUNTER (OUTPATIENT)
Dept: CARDIOLOGY | Facility: HOSPITAL | Age: 82
Discharge: HOME | End: 2022-01-10

## 2022-01-10 ENCOUNTER — HOSPITAL ENCOUNTER (OUTPATIENT)
Dept: ULTRASOUND IMAGING | Facility: HOSPITAL | Age: 82
Discharge: HOME | End: 2022-01-10

## 2022-01-10 VITALS
DIASTOLIC BLOOD PRESSURE: 60 MMHG | SYSTOLIC BLOOD PRESSURE: 150 MMHG | BODY MASS INDEX: 21.99 KG/M2 | WEIGHT: 112 LBS | HEIGHT: 60 IN

## 2022-01-10 DIAGNOSIS — I34.0 NONRHEUMATIC MITRAL VALVE REGURGITATION: ICD-10-CM

## 2022-01-10 DIAGNOSIS — I65.23 CAROTID ATHEROSCLEROSIS, BILATERAL: ICD-10-CM

## 2022-01-10 LAB
AORTIC DIMENSIONLESS INDEX: 0.8 (DI)
BH CV ECHO MEAS - ACS: 1.5 CM
BH CV ECHO MEAS - AO MAX PG: 7 MMHG
BH CV ECHO MEAS - AO MEAN PG (FULL): 2 MMHG
BH CV ECHO MEAS - AO MEAN PG: 4 MMHG
BH CV ECHO MEAS - AO ROOT AREA (BSA CORRECTED): 1.7
BH CV ECHO MEAS - AO ROOT AREA: 4.9 CM^2
BH CV ECHO MEAS - AO ROOT DIAM: 2.5 CM
BH CV ECHO MEAS - AO V2 MAX: 129 CM/SEC
BH CV ECHO MEAS - AO V2 MEAN: 93 CM/SEC
BH CV ECHO MEAS - AO V2 VTI: 29.1 CM
BH CV ECHO MEAS - AVA(I,A): 2.9 CM^2
BH CV ECHO MEAS - AVA(I,D): 2.9 CM^2
BH CV ECHO MEAS - BSA(HAYCOCK): 1.5 M^2
BH CV ECHO MEAS - BSA: 1.5 M^2
BH CV ECHO MEAS - BZI_BMI: 21.9 KILOGRAMS/M^2
BH CV ECHO MEAS - BZI_METRIC_HEIGHT: 152.4 CM
BH CV ECHO MEAS - BZI_METRIC_WEIGHT: 50.8 KG
BH CV ECHO MEAS - EDV(CUBED): 72.5 ML
BH CV ECHO MEAS - EDV(MOD-SP2): 48.5 ML
BH CV ECHO MEAS - EDV(MOD-SP4): 54.8 ML
BH CV ECHO MEAS - EDV(TEICH): 77.3 ML
BH CV ECHO MEAS - EF(CUBED): 78.5 %
BH CV ECHO MEAS - EF(MOD-BP): 68.4 %
BH CV ECHO MEAS - EF(MOD-SP2): 65.6 %
BH CV ECHO MEAS - EF(MOD-SP4): 71.5 %
BH CV ECHO MEAS - EF(TEICH): 71.1 %
BH CV ECHO MEAS - ESV(CUBED): 15.6 ML
BH CV ECHO MEAS - ESV(MOD-SP2): 16.7 ML
BH CV ECHO MEAS - ESV(MOD-SP4): 15.6 ML
BH CV ECHO MEAS - ESV(TEICH): 22.3 ML
BH CV ECHO MEAS - FS: 40 %
BH CV ECHO MEAS - IVS/LVPW: 0.82
BH CV ECHO MEAS - IVSD: 0.92 CM
BH CV ECHO MEAS - LAT PEAK E' VEL: 6.1 CM/SEC
BH CV ECHO MEAS - LV DIASTOLIC VOL/BSA (35-75): 37.6 ML/M^2
BH CV ECHO MEAS - LV MASS(C)D: 138.1 GRAMS
BH CV ECHO MEAS - LV MASS(C)DI: 94.6 GRAMS/M^2
BH CV ECHO MEAS - LV MAX PG: 4.5 MMHG
BH CV ECHO MEAS - LV MEAN PG: 2 MMHG
BH CV ECHO MEAS - LV SYSTOLIC VOL/BSA (12-30): 10.7 ML/M^2
BH CV ECHO MEAS - LV V1 MAX: 106 CM/SEC
BH CV ECHO MEAS - LV V1 MEAN: 71.2 CM/SEC
BH CV ECHO MEAS - LV V1 VTI: 24.2 CM
BH CV ECHO MEAS - LVIDD: 4.2 CM
BH CV ECHO MEAS - LVIDS: 2.5 CM
BH CV ECHO MEAS - LVLD AP2: 6.2 CM
BH CV ECHO MEAS - LVLD AP4: 6 CM
BH CV ECHO MEAS - LVLS AP2: 5.4 CM
BH CV ECHO MEAS - LVLS AP4: 5.2 CM
BH CV ECHO MEAS - LVOT AREA (M): 3.5 CM^2
BH CV ECHO MEAS - LVOT AREA: 3.5 CM^2
BH CV ECHO MEAS - LVOT DIAM: 2.1 CM
BH CV ECHO MEAS - LVPWD: 1.1 CM
BH CV ECHO MEAS - MED PEAK E' VEL: 4.5 CM/SEC
BH CV ECHO MEAS - MR MAX PG: 113.6 MMHG
BH CV ECHO MEAS - MR MAX VEL: 533 CM/SEC
BH CV ECHO MEAS - MR MEAN PG: 73 MMHG
BH CV ECHO MEAS - MR MEAN VEL: 398 CM/SEC
BH CV ECHO MEAS - MR VTI: 175 CM
BH CV ECHO MEAS - MV A MAX VEL: 136 CM/SEC
BH CV ECHO MEAS - MV DEC SLOPE: 410 CM/SEC^2
BH CV ECHO MEAS - MV DEC TIME: 298 SEC
BH CV ECHO MEAS - MV E MAX VEL: 111 CM/SEC
BH CV ECHO MEAS - MV E/A: 0.82
BH CV ECHO MEAS - MV MEAN PG: 5 MMHG
BH CV ECHO MEAS - MV P1/2T MAX VEL: 146 CM/SEC
BH CV ECHO MEAS - MV P1/2T: 104.3 MSEC
BH CV ECHO MEAS - MV V2 MEAN: 102 CM/SEC
BH CV ECHO MEAS - MV V2 VTI: 45.2 CM
BH CV ECHO MEAS - MVA P1/2T LCG: 1.5 CM^2
BH CV ECHO MEAS - MVA(P1/2T): 2.1 CM^2
BH CV ECHO MEAS - MVA(VTI): 1.9 CM^2
BH CV ECHO MEAS - PA MAX PG: 2.8 MMHG
BH CV ECHO MEAS - PA V2 MAX: 84.2 CM/SEC
BH CV ECHO MEAS - QP/QS: 0.45
BH CV ECHO MEAS - RAP SYSTOLE: 3 MMHG
BH CV ECHO MEAS - RV MEAN PG: 1 MMHG
BH CV ECHO MEAS - RV V1 MEAN: 42.4 CM/SEC
BH CV ECHO MEAS - RV V1 VTI: 13.3 CM
BH CV ECHO MEAS - RVOT AREA: 2.8 CM^2
BH CV ECHO MEAS - RVOT DIAM: 1.9 CM
BH CV ECHO MEAS - RVSP: 32 MMHG
BH CV ECHO MEAS - SI(AO): 97.9 ML/M^2
BH CV ECHO MEAS - SI(CUBED): 39 ML/M^2
BH CV ECHO MEAS - SI(LVOT): 57.5 ML/M^2
BH CV ECHO MEAS - SI(MOD-SP2): 21.8 ML/M^2
BH CV ECHO MEAS - SI(MOD-SP4): 26.9 ML/M^2
BH CV ECHO MEAS - SI(TEICH): 37.7 ML/M^2
BH CV ECHO MEAS - SV(AO): 142.8 ML
BH CV ECHO MEAS - SV(CUBED): 56.9 ML
BH CV ECHO MEAS - SV(LVOT): 83.8 ML
BH CV ECHO MEAS - SV(MOD-SP2): 31.8 ML
BH CV ECHO MEAS - SV(MOD-SP4): 39.2 ML
BH CV ECHO MEAS - SV(RVOT): 37.7 ML
BH CV ECHO MEAS - SV(TEICH): 54.9 ML
BH CV ECHO MEAS - TAPSE (>1.6): 2.1 CM
BH CV ECHO MEAS - TR MAX VEL: 273 CM/SEC
BH CV ECHO MEASUREMENTS AVERAGE E/E' RATIO: 20.94
BH CV XLRA - TDI S': 10.3 CM/SEC
LEFT ATRIUM VOLUME INDEX: 45 ML/M2
MAXIMAL PREDICTED HEART RATE: 138 BPM
SINUS: 2.7 CM
STRESS TARGET HR: 117 BPM

## 2022-01-10 PROCEDURE — 93306 TTE W/DOPPLER COMPLETE: CPT | Performed by: INTERNAL MEDICINE

## 2022-01-10 PROCEDURE — 93306 TTE W/DOPPLER COMPLETE: CPT

## 2022-01-10 PROCEDURE — 93880 EXTRACRANIAL BILAT STUDY: CPT

## 2022-02-17 PROCEDURE — 93296 REM INTERROG EVL PM/IDS: CPT | Performed by: INTERNAL MEDICINE

## 2022-02-17 PROCEDURE — 93294 REM INTERROG EVL PM/LDLS PM: CPT | Performed by: INTERNAL MEDICINE

## 2022-02-17 RX ORDER — AMLODIPINE BESYLATE 5 MG/1
TABLET ORAL
Qty: 90 TABLET | Refills: 3 | Status: SHIPPED | OUTPATIENT
Start: 2022-02-17

## 2022-05-19 PROCEDURE — 93294 REM INTERROG EVL PM/LDLS PM: CPT | Performed by: INTERNAL MEDICINE

## 2022-05-19 PROCEDURE — 93296 REM INTERROG EVL PM/IDS: CPT | Performed by: INTERNAL MEDICINE

## 2022-08-18 PROCEDURE — 93294 REM INTERROG EVL PM/LDLS PM: CPT | Performed by: INTERNAL MEDICINE

## 2022-08-18 PROCEDURE — 93296 REM INTERROG EVL PM/IDS: CPT | Performed by: INTERNAL MEDICINE

## 2022-12-05 NOTE — PROGRESS NOTES
RM:________     PCP: Hoa Sims MD    : 1940  AGE: 82 y.o.  EST PATIENT   REASON FOR VISIT/  CC:    BP Readings from Last 3 Encounters:   01/10/22 150/60   21 152/68   10/27/20 170/78        WT: ____________ BP: __________L __________R HR______    CHEST PAIN: _____________    SOA: _____________PALPS: _______________     LIGHTHEADED: ___________FATIGUE: ________________ EDEMA __________    ALLERGIES:Statins SMOKING HISTORY:  Social History     Tobacco Use   • Smoking status: Never   • Smokeless tobacco: Never   • Tobacco comments:     caffeine use: 2 -3 cups of coffee daily.   Substance Use Topics   • Alcohol use: No   • Drug use: Defer     CAFFEINE USE_________________  ALCOHOL ______________________    Below is the patient's most recent value for Albumin, ALT, AST, BUN, Calcium, Chloride, Cholesterol, CO2, Creatinine, GFR, Glucose, HDL, Hematocrit, Hemoglobin, Hemoglobin A1C, LDL, Magnesium, Phosphorus, Platelets, Potassium, PSA, Sodium, Triglycerides, TSH and WBC.   Lab Results   Component Value Date    ALBUMIN 4.3 2021    ALT 15 2021    AST 19 2021    BUN 24 (H) 2021    CALCIUM 9.8 2021     2021    CHOL 239 (H) 2016    CO2 24 2021    CREATININE 0.9 2021     (H) 2021    HDL 55 2020    HCT 39.6 2021    HGB 12.5 2021    HGBA1C 6.3 (H) 2021    LDL 88 2020    MG 1.8 2017     2021    K 4.6 2021     (L) 2021    TRIG 100 2020    TSH 0.767 2021    WBC 7.67 2021          NEW DIAGNOSIS/ SURGERY/ HOSP OR ED VISITS: ______________________    __________________________________________________________________      RECENT LABS OR DIAGNOSTIC TESTING:  _____________________________    __________________________________________________________________      ASSESSMENT/ PLAN:  _______________________________________________    __________________________________________________________________

## 2022-12-08 ENCOUNTER — CLINICAL SUPPORT NO REQUIREMENTS (OUTPATIENT)
Dept: CARDIOLOGY | Facility: CLINIC | Age: 82
End: 2022-12-08

## 2022-12-08 DIAGNOSIS — I49.5 SICK SINUS SYNDROME: Primary | ICD-10-CM

## 2022-12-08 PROCEDURE — 93280 PM DEVICE PROGR EVAL DUAL: CPT | Performed by: INTERNAL MEDICINE

## 2023-01-03 ENCOUNTER — OFFICE VISIT (OUTPATIENT)
Dept: CARDIOLOGY | Facility: CLINIC | Age: 83
End: 2023-01-03
Payer: MEDICARE

## 2023-01-03 VITALS
WEIGHT: 114.63 LBS | HEIGHT: 60 IN | BODY MASS INDEX: 22.51 KG/M2 | DIASTOLIC BLOOD PRESSURE: 72 MMHG | SYSTOLIC BLOOD PRESSURE: 144 MMHG | HEART RATE: 64 BPM

## 2023-01-03 DIAGNOSIS — I65.23 CAROTID ATHEROSCLEROSIS, BILATERAL: ICD-10-CM

## 2023-01-03 DIAGNOSIS — Z95.0 PACEMAKER: ICD-10-CM

## 2023-01-03 DIAGNOSIS — I10 PRIMARY HYPERTENSION: ICD-10-CM

## 2023-01-03 DIAGNOSIS — I34.0 MITRAL VALVE INSUFFICIENCY, UNSPECIFIED ETIOLOGY: ICD-10-CM

## 2023-01-03 DIAGNOSIS — I49.5 SICK SINUS SYNDROME: Primary | ICD-10-CM

## 2023-01-03 PROCEDURE — 93000 ELECTROCARDIOGRAM COMPLETE: CPT | Performed by: INTERNAL MEDICINE

## 2023-01-03 PROCEDURE — 99214 OFFICE O/P EST MOD 30 MIN: CPT | Performed by: INTERNAL MEDICINE

## 2023-01-03 PROCEDURE — 1160F RVW MEDS BY RX/DR IN RCRD: CPT | Performed by: INTERNAL MEDICINE

## 2023-01-03 PROCEDURE — 1159F MED LIST DOCD IN RCRD: CPT | Performed by: INTERNAL MEDICINE

## 2023-01-03 RX ORDER — BIMATOPROST 0.01 %
DROPS OPHTHALMIC (EYE) DAILY
COMMUNITY
Start: 2022-12-19

## 2023-01-03 NOTE — PROGRESS NOTES
Date of Office Visit: 2023  Encounter Provider: Philippe De Anda MD  Place of Service: [facility]  Patient Name: Aida Vallejo  :1940    Chief Complaint   Patient presents with   • Irregular Heart Beat   :     HPI: Aida Vallejo is a 82 y.o. female who presents today to follow up.  I have reviewed prior notes and there are no changes except for any new updates described below. I have also reviewed any information entered into the medical record by the patient or by ancillary staff.     I initially evalauted her in 2014 for hypertension.  At that time, she had been on losartan and amlodipine but developed low blood pressure so her medications were adjusted.  She also noted periods where it felt like her heart rate dropped to the 50s and was irregular.  A Holter showed NSR with occasional sinus bradycardia, and rare periods of junctional escape (average rate in the 40s).      In 2017, she was re-evaluated for similar symptoms.  A repeat Holter was similar to the previous one; it did show some pauses up to 3.6 seconds.  She was asymptomatic during the monitored period.  She then presented with persistent bradycardia (to the 30s) and was in a junctional rhythm.  She underwent uncomplicated implantation of a [employer] dual-chambered pacemaker.  An echo at that time showed mild-moderate MR. The MR remained unchanged in 2022.     She had a carotid doppler in  that reported 50-69% stenosis on the right, and <50% disease on the left. In , she had 50-69% disease bilaterally. This was stable in .     She denies any cardiac symptoms at this time.     Past Medical History:   Diagnosis Date   • Carotid atherosclerosis, bilateral    • Diabetes mellitus, type 2 (HCC)    • Glaucoma    • Hyperlipidemia    • Hypertension    • Polyclonal gammopathy    • Sick sinus syndrome (HCC)     s/p [employer] DCPPM 2017       Past Surgical History:   Procedure  Laterality Date   • CARDIAC ELECTROPHYSIOLOGY PROCEDURE N/A 9/25/2017    Procedure: Pacemaker DC new [place] ;  Surgeon: Prateek Chin MD;  Location: [facility] INVASIVE LOCATION;  Service:    • EYE SURGERY Bilateral     cataracts   • RETINAL LASER PROCEDURE         Social History     Socioeconomic History   • Marital status:    Tobacco Use   • Smoking status: Never   • Smokeless tobacco: Never   • Tobacco comments:     caffeine use: 2 -3 cups of coffee daily.   Vaping Use   • Vaping Use: Never used   Substance and Sexual Activity   • Alcohol use: No   • Drug use: Defer   • Sexual activity: Defer       History reviewed. No pertinent family history.    Review of Systems   Eyes: Negative for blurred vision.   Cardiovascular: Negative for chest pain, orthopnea, palpitations and paroxysmal nocturnal dyspnea.   Respiratory: Negative for shortness of breath.    Musculoskeletal: Negative for neck pain.   Neurological: Negative for headaches.   All other systems reviewed and are negative.      Allergies   Allergen Reactions   • Losartan Dizziness     Other reaction(s): Dizziness            Current Outpatient Medications:   •  albuterol sulfate  (90 Base) MCG/ACT inhaler, , Disp: , Rfl:   •  amLODIPine (NORVASC) 5 MG tablet, Take 1/2 (one-half) tablet by mouth twice daily, Disp: 90 tablet, Rfl: 3  •  aspirin 81 MG chewable tablet, Chew 81 mg. PT REPORTS TAKING 4 X PER WK., Disp: , Rfl:   •  budesonide (PULMICORT) 90 MCG/ACT inhaler, Inhale 1 puff Daily., Disp: , Rfl:   •  Cholecalciferol (VITAMIN D PO), Take 2,000 Units by mouth., Disp: , Rfl:   •  CINNAMON PO, Take 1,000 mg by mouth Daily., Disp: , Rfl:   •  Coenzyme Q10 (CO Q 10 PO), Take 300 mg by mouth Daily., Disp: , Rfl:   •  cycloSPORINE (RESTASIS OP), Apply  to eye(s) as directed by provider., Disp: , Rfl:   •  dexamethasone (DECADRON) 6 MG tablet, , Disp: , Rfl:   •  dorzolamide-timolol (COSOPT) 22.3-6.8 MG/ML ophthalmic solution, Administer 1  drop to both eyes 2 (Two) Times a Day., Disp: , Rfl:   •  ferrous sulfate 325 (65 FE) MG tablet, Take 325 mg by mouth Daily With Breakfast., Disp: , Rfl:   •  FLUAD 0.5 ML suspension prefilled syringe, ADMINISTER VACCINE PER PHYSICIAN PROTOCOL, Disp: , Rfl: 0  •  glimepiride (AMARYL) 1 MG tablet, Take 1 mg by mouth every night at bedtime., Disp: , Rfl:   •  lisinopril (PRINIVIL,ZESTRIL) 5 MG tablet, TAKE 1 TABLET BY MOUTH ONCE DAILY, Disp: , Rfl:   •  Lumigan 0.01 % ophthalmic drops, Daily., Disp: , Rfl:   •  metFORMIN (GLUCOPHAGE) 500 MG tablet, Take 500 mg by mouth 2 (Two) Times a Day With Meals., Disp: , Rfl:   •  Multiple Vitamins-Minerals (MULTIVITAMIN WITH MINERALS) tablet tablet, Take 1 tablet by mouth Daily., Disp: , Rfl:   •  Omega-3 Fatty Acids (FISH OIL) 1000 MG capsule capsule, Take  by mouth Daily With Breakfast., Disp: , Rfl:   •  pravastatin (PRAVACHOL) 20 MG tablet, TAKE 1 TABLET BY MOUTH ONCE DAILY AT NIGHT, Disp: , Rfl:   •  vitamin B-12 (CYANOCOBALAMIN) 1000 MCG tablet, Take 1,000 mcg by mouth Daily., Disp: , Rfl:   •  zinc sulfate (ZINCATE) 220 (50 Zn) MG capsule, Take 220 mg by mouth Daily., Disp: , Rfl:      Objective:     Vitals:    01/03/23 1042   BP: 144/72   BP Location: Right arm   Pulse: 64   Weight: 52 kg (114 lb 10.1 oz)   Height: 152.4 cm (60\")     Body mass index is 22.39 kg/m².    Physical Exam  Vitals reviewed.   Constitutional:       Appearance: She is well-developed.   HENT:      Head: Normocephalic.      Nose: Nose normal.      Mouth/Throat:      Comments: Masked  Eyes:      Conjunctiva/sclera: Conjunctivae normal.   Neck:      Vascular: No JVD.   Cardiovascular:      Rate and Rhythm: Normal rate and regular rhythm.      Pulses: Normal pulses and intact distal pulses.           Carotid pulses are on the right side with bruit.     Heart sounds: Normal heart sounds. No murmur heard.  Pulmonary:      Effort: Pulmonary effort is normal.      Breath sounds: Normal breath sounds.    Abdominal:      Palpations: Abdomen is soft.      Tenderness: There is no abdominal tenderness.   Musculoskeletal:         General: No swelling. Normal range of motion.      Cervical back: Normal range of motion.   Skin:     General: Skin is warm and dry.      Findings: No rash.   Neurological:      General: No focal deficit present.      Mental Status: She is alert and oriented to person, place, and time.      Cranial Nerves: No cranial nerve deficit.   Psychiatric:         Mood and Affect: Mood normal.         Behavior: Behavior normal.         Thought Content: Thought content normal.           ECG 12 Lead    Date/Time: 1/3/2023 10:56 AM  Performed by: Philippe De Anda MD  Authorized by: Philippe De Anda MD   Comparison: compared with previous ECG   Similar to previous ECG  Rhythm: sinus rhythm  Conduction: non-specific intraventricular conduction delay  ST Segments: ST segments normal  T Waves: T waves normal  QRS axis: normal  Other: no other findings    Clinical impression: non-specific ECG            Assessment:       Diagnosis Plan   1. Sick sinus syndrome (HCC)  ECG 12 Lead      2. Pacemaker        3. Mitral valve insufficiency, unspecified etiology        4. Primary hypertension        5. Carotid atherosclerosis, bilateral           Plan:       1/2.  She has SSS and is now s/p PPM. We follow this in device clinic.    3. She had mild-moderate MR in 2017 and in 2022; I'll repeat an echo in 2024.    4.  Her average SBP at home is 130 mm Hg, which is very good for her.  It's always high at the doctors' office.    5. Serial studies have shown stable 50-69% disease bilaterally (last checked in January 2022). I'll repeat this in January 2024.     Sincerely,       Philippe De Anda MD

## 2023-02-16 PROCEDURE — 93294 REM INTERROG EVL PM/LDLS PM: CPT | Performed by: INTERNAL MEDICINE

## 2023-02-16 PROCEDURE — 93296 REM INTERROG EVL PM/IDS: CPT | Performed by: INTERNAL MEDICINE

## 2023-05-30 NOTE — TELEPHONE ENCOUNTER
Rx did not pass protocol and will have to send to the ordering provider for review and fill.   Please see pending rx.            However pt's last labs from [facility] is copied below, from November.    COMPREHENSIVE METABOLIC PANEL  Order: [id number]  Component   Ref Range & Units 6 mo ago   Sodium   136 - 145 mmol/L 137    Potassium   3.5 - 5.1 mmol/L 4.6    Chloride   98 - 107 mmol/L 101    Total CO2   22 - 29 mmol/L 22    Anion Gap   5 - 13 (arb'U) 14 High     Comment: (note)   Calculation- Na - (Cl + CO2)   Glucose   71 - 139 mg/dL 141 High     BUN   10 - 20 mg/dL 21 High     Creatinine   0.55 - 1.02 mg/dL 0.88    BUN/Creatinine Ratio   RATIO 24.1    Est GFR by Clearance   >60 /1.73 m2 >60    Comment: (note)   Results do not take into account body mass.  Valid for patients 18   to 70 years of age.   Estimated GFR if -American   >60 /1.73 m2 >60    Comment: (note)   Results do not take into account body mass.  Valid for patients 18   to 70 years of age.   Total Protein   6.2 - 8.0 g/dL 7.6    Albumin   3.2 - 4.6 g/dL 4.6    Globulin   1.5 - 4.5 g/dL 3.0    A/G Ratio   1.1 - 2.5 RATIO 1.5    Calcium   8.4 - 10.2 mg/dL 9.5    Total Bilirubin   0.2 - 1.2 mg/dL 0.4    AST (SGOT)   10 - 35 U/L 27    ALT (SGPT)   10 - 35 U/L 23    Alkaline Phosphatase   40 - 150 U/L 59    Resulting Agency [facility]   Specimen Collected: 11/16/22 11:00 Last Resulted: 11/16/22 16:04   Received From: [facility]  Result Received: 12/08/22 09:52

## 2023-05-31 RX ORDER — AMLODIPINE BESYLATE 5 MG/1
2.5 TABLET ORAL EVERY 12 HOURS SCHEDULED
Qty: 90 TABLET | Refills: 3 | Status: SHIPPED | OUTPATIENT
Start: 2023-05-31

## 2023-07-18 ENCOUNTER — TELEPHONE (OUTPATIENT)
Dept: CARDIOLOGY | Facility: CLINIC | Age: 83
End: 2023-07-18

## 2023-07-18 NOTE — TELEPHONE ENCOUNTER
Let's just watch things for now. Short run, <20 beats, occurred at 1007 pm, presumably during sleep.    sanket

## 2023-07-18 NOTE — TELEPHONE ENCOUNTER
Pt with DDDR pacer and remote transmission alerting for NST-VT     Pt AP 13%  0%    1 NST-VT event on 7/13/23 lasting 15 beats avg V rate 174 BPM, called and spoke with pt and she does not recall any symptoms. pt has had NST labeled events in the past but this appears to be first true NST-VT    She has next device check 12/14/23 at [facility] and next provider appt with JONATAN Blanchard 1/22/24

## 2023-08-17 PROCEDURE — 93296 REM INTERROG EVL PM/IDS: CPT | Performed by: INTERNAL MEDICINE

## 2023-08-17 PROCEDURE — 93294 REM INTERROG EVL PM/LDLS PM: CPT | Performed by: INTERNAL MEDICINE

## 2023-12-14 ENCOUNTER — CLINICAL SUPPORT NO REQUIREMENTS (OUTPATIENT)
Dept: CARDIOLOGY | Facility: CLINIC | Age: 83
End: 2023-12-14
Payer: MEDICARE

## 2023-12-14 DIAGNOSIS — I49.5 SICK SINUS SYNDROME: Primary | ICD-10-CM

## 2024-01-18 ENCOUNTER — HOSPITAL ENCOUNTER (OUTPATIENT)
Dept: ULTRASOUND IMAGING | Facility: HOSPITAL | Age: 84
Discharge: HOME | End: 2024-01-18
Payer: MEDICARE

## 2024-01-18 ENCOUNTER — HOSPITAL ENCOUNTER (OUTPATIENT)
Dept: CARDIOLOGY | Facility: HOSPITAL | Age: 84
Discharge: HOME | End: 2024-01-18
Payer: MEDICARE

## 2024-01-18 VITALS
BODY MASS INDEX: 22.51 KG/M2 | SYSTOLIC BLOOD PRESSURE: 124 MMHG | HEART RATE: 66 BPM | DIASTOLIC BLOOD PRESSURE: 55 MMHG | HEIGHT: 60 IN | WEIGHT: 114.64 LBS

## 2024-01-18 DIAGNOSIS — I34.0 MITRAL VALVE INSUFFICIENCY, UNSPECIFIED ETIOLOGY: ICD-10-CM

## 2024-01-18 DIAGNOSIS — I65.23 CAROTID ATHEROSCLEROSIS, BILATERAL: ICD-10-CM

## 2024-01-18 LAB
AORTIC DIMENSIONLESS INDEX: 0.9 (DI)
BH CV ECHO MEAS - AO MAX PG: 13.8 MMHG
BH CV ECHO MEAS - AO MEAN PG: 8 MMHG
BH CV ECHO MEAS - AO V2 MAX: 186 CM/SEC
BH CV ECHO MEAS - AO V2 VTI: 35.8 CM
BH CV ECHO MEAS - AVA(I,D): 2.12 CM2
BH CV ECHO MEAS - EDV(CUBED): 54.9 ML
BH CV ECHO MEAS - EDV(MOD-SP2): 74 ML
BH CV ECHO MEAS - EDV(MOD-SP4): 57 ML
BH CV ECHO MEAS - EF(MOD-BP): 67 %
BH CV ECHO MEAS - EF(MOD-SP2): 64.9 %
BH CV ECHO MEAS - EF(MOD-SP4): 68.4 %
BH CV ECHO MEAS - ESV(CUBED): 20 ML
BH CV ECHO MEAS - ESV(MOD-SP2): 26 ML
BH CV ECHO MEAS - ESV(MOD-SP4): 18 ML
BH CV ECHO MEAS - FS: 28.5 %
BH CV ECHO MEAS - IVS/LVPW: 1.09 CM
BH CV ECHO MEAS - IVSD: 1.2 CM
BH CV ECHO MEAS - LAT PEAK E' VEL: 7.9 CM/SEC
BH CV ECHO MEAS - LV DIASTOLIC VOL/BSA (35-75): 38.8 CM2
BH CV ECHO MEAS - LV MASS(C)D: 143.8 GRAMS
BH CV ECHO MEAS - LV MAX PG: 6.8 MMHG
BH CV ECHO MEAS - LV MEAN PG: 4 MMHG
BH CV ECHO MEAS - LV SYSTOLIC VOL/BSA (12-30): 12.2 CM2
BH CV ECHO MEAS - LV V1 MAX: 130 CM/SEC
BH CV ECHO MEAS - LV V1 VTI: 29.1 CM
BH CV ECHO MEAS - LVIDD: 3.8 CM
BH CV ECHO MEAS - LVIDS: 2.7 CM
BH CV ECHO MEAS - LVOT AREA: 2.6 CM2
BH CV ECHO MEAS - LVOT DIAM: 1.82 CM
BH CV ECHO MEAS - LVPWD: 1.1 CM
BH CV ECHO MEAS - MED PEAK E' VEL: 6.7 CM/SEC
BH CV ECHO MEAS - MR MAX PG: 143.1 MMHG
BH CV ECHO MEAS - MR MAX VEL: 598 CM/SEC
BH CV ECHO MEAS - MR MEAN PG: 102.1 MMHG
BH CV ECHO MEAS - MR MEAN VEL: 493 CM/SEC
BH CV ECHO MEAS - MR VTI: 205.7 CM
BH CV ECHO MEAS - MV A MAX VEL: 136 CM/SEC
BH CV ECHO MEAS - MV DEC SLOPE: 674.6 CM/SEC2
BH CV ECHO MEAS - MV DEC TIME: 0.27 SEC
BH CV ECHO MEAS - MV E MAX VEL: 120 CM/SEC
BH CV ECHO MEAS - MV E/A: 0.88
BH CV ECHO MEAS - MV MAX PG: 8.7 MMHG
BH CV ECHO MEAS - MV MEAN PG: 2.9 MMHG
BH CV ECHO MEAS - MV P1/2T: 67 MSEC
BH CV ECHO MEAS - MV V2 VTI: 47.3 CM
BH CV ECHO MEAS - MVA(P1/2T): 3.3 CM2
BH CV ECHO MEAS - MVA(VTI): 1.61 CM2
BH CV ECHO MEAS - PA ACC TIME: 0.15 SEC
BH CV ECHO MEAS - PA V2 MAX: 102 CM/SEC
BH CV ECHO MEAS - PULM A REVS DUR: 0.16 SEC
BH CV ECHO MEAS - PULM A REVS VEL: 30.1 CM/SEC
BH CV ECHO MEAS - PULM DIAS VEL: 47.6 CM/SEC
BH CV ECHO MEAS - PULM S/D: 1.59
BH CV ECHO MEAS - PULM SYS VEL: 75.5 CM/SEC
BH CV ECHO MEAS - QP/QS: 0.81
BH CV ECHO MEAS - RAP SYSTOLE: 3 MMHG
BH CV ECHO MEAS - RV MAX PG: 2.5 MMHG
BH CV ECHO MEAS - RV V1 MAX: 79.1 CM/SEC
BH CV ECHO MEAS - RV V1 VTI: 20.4 CM
BH CV ECHO MEAS - RVOT DIAM: 1.96 CM
BH CV ECHO MEAS - RVSP: 45.1 MMHG
BH CV ECHO MEAS - SI(MOD-SP2): 32.6 ML/M2
BH CV ECHO MEAS - SI(MOD-SP4): 26.5 ML/M2
BH CV ECHO MEAS - SV(LVOT): 76.1 ML
BH CV ECHO MEAS - SV(MOD-SP2): 48 ML
BH CV ECHO MEAS - SV(MOD-SP4): 39 ML
BH CV ECHO MEAS - SV(RVOT): 61.4 ML
BH CV ECHO MEAS - TR MAX PG: 42.1 MMHG
BH CV ECHO MEAS - TR MAX VEL: 324.4 CM/SEC
BH CV ECHO MEASUREMENTS AVERAGE E/E' RATIO: 16.44
BH CV XLRA - RV BASE: 2.7 CM
BH CV XLRA - RV LENGTH: 6.8 CM
BH CV XLRA - RV MID: 2.3 CM
BH CV XLRA - TDI S': 11.2 CM/SEC
SINUS: 2.6 CM

## 2024-01-18 PROCEDURE — 25510000001 PERFLUTREN (DEFINITY) 8.476 MG IN SODIUM CHLORIDE (PF) 0.9 % 10 ML INJECTION: Performed by: INTERNAL MEDICINE

## 2024-01-18 PROCEDURE — 93306 TTE W/DOPPLER COMPLETE: CPT

## 2024-01-18 PROCEDURE — 93880 EXTRACRANIAL BILAT STUDY: CPT

## 2024-01-18 RX ADMIN — SODIUM CHLORIDE 2 ML: 9 INJECTION INTRAMUSCULAR; INTRAVENOUS; SUBCUTANEOUS at 10:18

## 2024-01-25 ENCOUNTER — OFFICE VISIT (OUTPATIENT)
Dept: CARDIOLOGY | Facility: CLINIC | Age: 84
End: 2024-01-25
Payer: MEDICARE

## 2024-01-25 VITALS
HEART RATE: 65 BPM | HEIGHT: 60 IN | WEIGHT: 114 LBS | DIASTOLIC BLOOD PRESSURE: 62 MMHG | BODY MASS INDEX: 22.38 KG/M2 | OXYGEN SATURATION: 96 % | SYSTOLIC BLOOD PRESSURE: 148 MMHG

## 2024-01-25 DIAGNOSIS — I10 PRIMARY HYPERTENSION: ICD-10-CM

## 2024-01-25 DIAGNOSIS — Z95.0 PACEMAKER: ICD-10-CM

## 2024-01-25 DIAGNOSIS — I49.5 SICK SINUS SYNDROME: Primary | ICD-10-CM

## 2024-01-25 DIAGNOSIS — I34.0 MITRAL VALVE INSUFFICIENCY, UNSPECIFIED ETIOLOGY: ICD-10-CM

## 2024-01-25 NOTE — PROGRESS NOTES
"    CARDIOLOGY        Patient Name: Aida Vallejo  :1940  Age: 83 y.o.  Primary Cardiologist: Philippe De Anda MD  Encounter Provider:  Srikanth Richards PA-C    Date of Service: 24        CHIEF COMPLAINT / REASON FOR OFFICE VISIT     Sick sinus syndrome      HISTORY OF PRESENT ILLNESS       HPI  Aida Vallejo is a 83 y.o. female who presents today for 1 year follow-up.     Pt has a  history significant for sick sinus syndrome, hypertension, carotid arthrosclerosis bilaterally, and mitral valve insufficiency presents for 1 year follow-up.  Patient was last seen in office on 1/3/2023.  She was status post PPM at that time.  Patient to follow-up with device clinic.  She had a repeat echo along with repeat carotid duplex at that time.    Patient states that she has been doing well since last visit.  Patient has not had any symptoms of chest pain, shortness of breath, palpitations, lightheadedness, edema to legs, or bleeding issues.  She has been doing well.  Patient was recently placed on some antibiotics for sinus infection but she states that she gets them every year and she is on the mend.    The following portions of the patient's history were reviewed and updated as appropriate: allergies, current medications, past family history, past medical history, past social history, past surgical history and problem list.      VITAL SIGNS     Visit Vitals  /62 (BP Location: Left arm, Patient Position: Sitting, Cuff Size: Adult)   Pulse 65   Ht 152.4 cm (60\")   Wt 51.7 kg (114 lb)   SpO2 96%   BMI 22.26 kg/m²         Wt Readings from Last 3 Encounters:   24 51.7 kg (114 lb)   24 52 kg (114 lb 10.2 oz)   23 52 kg (114 lb 10.1 oz)     Body mass index is 22.26 kg/m².        PHYSICAL EXAMINATION     Constitutional:       General: Awake.      Appearance: Not in distress. Chronically ill-appearing.   Pulmonary:      Effort: Pulmonary effort is normal.      Breath sounds: Normal breath sounds. "   Cardiovascular:      Normal rate. Regular rhythm.      Murmurs: There is a grade 3/6 early systolic murmur.   Pulses:     Intact distal pulses.   Edema:     Peripheral edema absent.   Skin:     General: Skin is warm.   Neurological:      Mental Status: Alert and easily aroused.           REVIEWED DATA       ECG 12 Lead    Date/Time: 1/25/2024 9:51 AM  Performed by: Srikanth Richards PA-C    Authorized by: Srikanth Richards PA-C  Comparison: compared with previous ECG   Similar to previous ECG  Rhythm: paced  BPM: 65  Conduction: non-specific intraventricular conduction delay  T inversion: aVF and III  Comments: To previous with worsening T wave inversion in lead III          Cardiac Procedures:    Carotid Duplex on 1/18/2024    IMPRESSION:  1. At least moderate atheromatous plaque in the carotid vessels as  detailed above.  2. Flow velocities support approximately 50-69% stenosis in the left  internal carotid artery. Flow velocities are approaching 50-69% stenosis  on the right as well.  3. Antegrade flow in the vertebral arteries bilaterally.     This report was finalized on 1/18/2024 10:27 AM by Dr. Gino Stroud MD.       Transthoracic echo on 1/18/2024  Interpretation Summary       Left ventricular systolic function is normal. Calculated left ventricular EF = 67%    Left ventricular wall thickness is consistent with mild concentric hypertrophy. Left ventricular diastolic function was indeterminate due to the presence of functional MS.    There is severe mitral annular calcification (MAC) but resulting in only mild functional stenosis (mean PG 3 mmHg at HR 60 bpm).  Mild-moderate mitral regurgitation.    Estimated right ventricular systolic pressure from tricuspid regurgitation is moderately elevated (45-55 mmHg). Calculated right ventricular systolic pressure from tricuspid regurgitation is 45 mmHg.    Aortic valve sclerosis without hemodynamically significant stenosis.  Mild aortic regurgitation.     "Mild LA enlargement, normal RA and IVC size.     Compared to TTE from 1/10/2022, there is now evidence of mild pulmonary hypertension; otherwise no significant change.    Lab Results   Component Value Date     11/16/2022     (L) 05/16/2022    K 4.6 11/16/2022    K 4.3 05/16/2022     11/16/2022    CL 99 05/16/2022    CO2 22 11/16/2022    CO2 23 05/16/2022    BUN 21 (H) 11/16/2022    BUN 19 05/16/2022    CREATININE 0.88 11/16/2022    CREATININE 0.88 05/16/2022    CREATININE 6.0 (L) 05/16/2022    EGFRIFNONA 60 (L) 09/24/2017    EGFRIFNONA 57 (L) 09/23/2017    EGFRIFAFRI >60 11/16/2022    GLUCOSE 76 09/24/2017    GLUCOSE 110 (H) 09/23/2017    CALCIUM 9.5 11/16/2022    CALCIUM 9.7 05/16/2022    ALBUMIN 4.0 08/09/2023    ALBUMIN 4.6 11/16/2022    ALBUMIN 4.3 11/16/2022    BILITOT 0.4 11/16/2022    BILITOT 0.4 05/16/2022    AST 27 11/16/2022    AST 28 05/16/2022    ALT 23 11/16/2022    ALT 23 05/16/2022     Lab Results   Component Value Date    WBC 8.71 08/09/2023    WBC 7.67 07/29/2021    HGB 12.2 08/09/2023    HGB 12.5 07/29/2021    HCT 38.3 08/09/2023    HCT 39.6 07/29/2021    MCV 88.0 08/09/2023    MCV 90.6 07/29/2021     08/09/2023     07/29/2021     No results found for: \"PROBNP\", \"BNP\"  Lab Results   Component Value Date    TROPONINT <0.010 09/22/2017     Lab Results   Component Value Date    TSH 0.767 07/29/2021    TSH 1.280 08/03/2020             ASSESSMENT & PLAN     Diagnoses and all orders for this visit:    1. Sick sinus syndrome (Primary)   Status post permanent pacemaker placed on 9/25/2017.  Patient had a device check on 12/27/2023 showed normal function.     2. Mitral valve insufficiency, unspecified etiology   Repeat echo performed on 1/18/2024 showed continuation of mild to moderate mitral regurgitation.  Some elevation in her RV SP along with evidence of mild pulmonary hypertension.  Otherwise unchanged from previous a year ago.  Patient has no shortness of air, BARNES, PND, " or orthopnea.  Will repeat again in 1 year.  Asymptomatic and will hold on any diuretics.    -     Adult Transthoracic Echo Complete W/ Cont if Necessary Per Protocol; Future    3. Primary hypertension   Elevated mildly in the office today patient's blood pressure runs better at home per patient.  Patient told to continue checking at home along with sodium restriction.    -     Adult Transthoracic Echo Complete W/ Cont if Necessary Per Protocol; Future    4.  Carotid arterosclerosis   Stable      Return in about 1 year (around 1/25/2025) for Dr. De Anda.    Future Appointments         Provider Department Center    1/21/2025 9:30 AM [facility] ECHO CART 1 [facility] CARDIOLOGY [facility]    1/28/2025 10:15 AM Philippe De Anda MD [facility] CARDIOLOGY [facility]                MEDICATIONS         Discharge Medications            Accurate as of January 25, 2024 10:15 AM. If you have any questions, ask your nurse or doctor.                Continue These Medications        Instructions Start Date   albuterol sulfate  (90 Base) MCG/ACT inhaler  Commonly known as: PROVENTIL HFA;VENTOLIN HFA;PROAIR HFA   No dose, route, or frequency recorded.      amLODIPine 5 MG tablet  Commonly known as: NORVASC   2.5 mg, Oral, Every 12 Hours Scheduled      aspirin 81 MG chewable tablet   81 mg, Oral, PT REPORTS TAKING 4 X PER WK.      budesonide 90 MCG/ACT inhaler  Commonly known as: PULMICORT   1 puff, Inhalation, Daily      CINNAMON PO   1,000 mg, Oral, Daily      CO Q 10 PO   300 mg, Oral, Daily      dexAMETHasone 6 MG tablet  Commonly known as: DECADRON   No dose, route, or frequency recorded.      dorzolamide-timolol 2-0.5 % ophthalmic solution  Commonly known as: COSOPT   1 drop, Both Eyes, 2 Times Daily      ferrous sulfate 325 (65 FE) MG tablet   325 mg, Oral, Daily With Breakfast      fish oil 1000 MG capsule capsule   Oral, Daily With Breakfast      Fluad 0.5 ML suspension prefilled syringe  Generic drug: Influenza  Vac A&B Surf Ant Adj   ADMINISTER VACCINE PER PHYSICIAN PROTOCOL      glimepiride 1 MG tablet  Commonly known as: AMARYL   1 mg, Oral, Every Night at Bedtime      lisinopril 5 MG tablet  Commonly known as: PRINIVIL,ZESTRIL   TAKE 1 TABLET BY MOUTH ONCE DAILY      Lumigan 0.01 % ophthalmic drops  Generic drug: bimatoprost   Daily      metFORMIN 500 MG tablet  Commonly known as: GLUCOPHAGE   500 mg, Oral, 2 Times Daily With Meals      multivitamin with minerals tablet tablet   1 tablet, Oral, Daily      pravastatin 20 MG tablet  Commonly known as: PRAVACHOL   TAKE 1 TABLET BY MOUTH ONCE DAILY AT NIGHT      RESTASIS OP   Ophthalmic      vitamin B-12 1000 MCG tablet  Commonly known as: CYANOCOBALAMIN   1,000 mcg, Oral, Daily      VITAMIN D PO   2,000 Units, Oral      zinc sulfate 220 (50 Zn) MG capsule  Commonly known as: ZINCATE   220 mg, Oral, Daily                   **Dragon Disclaimer:   Much of this encounter note is an electronic transcription/translation of spoken language to printed text. The electronic translation of spoken language may permit erroneous, or at times, nonsensical words or phrases to be inadvertently transcribed. Although I have reviewed the note for such errors, some may still exist.

## 2024-02-01 ENCOUNTER — TELEPHONE (OUTPATIENT)
Age: 84
End: 2024-02-01
Payer: MEDICARE

## 2024-02-01 NOTE — TELEPHONE ENCOUNTER
Patient with DDDR pacer remote transmission reviewed today documented  1 NSVT event on 1/27/24 EGM shows 15 beats of NSVT with rate @ 170 BPM 13 seconds long, patient with hx rare NSVT. I LVM for patient requesting a call back if she had any symptoms with events.    NSVT Event on 1/27/24.

## 2024-05-06 RX ORDER — AMLODIPINE BESYLATE 5 MG/1
2.5 TABLET ORAL EVERY 12 HOURS SCHEDULED
Qty: 90 TABLET | Refills: 2 | Status: SHIPPED | OUTPATIENT
Start: 2024-05-06

## 2024-11-19 ENCOUNTER — TELEPHONE (OUTPATIENT)
Dept: CARDIOLOGY | Facility: CLINIC | Age: 84
End: 2024-11-19

## 2024-11-19 NOTE — TELEPHONE ENCOUNTER
Caller: Alanis Waller    Relationship: Emergency Contact    Best call back number: [phone]    What is the best time to reach you: ANY    Who are you requesting to speak with (clinical staff, provider,  specific staff member): CLINICAL    Do you know the name of the person who called: NA    What was the call regarding: PATIENT IS IN ICU AT [facility]. DR IBARRA SENT PAPERWORK FOR MRI. PATIENT'S DAUGHTER CALLED AND STATED PRESENCE OF PACEMAKER. NEED PAPERS SIGNED OFF ASAP. PLEASE ADVISE.    Is it okay if the provider responds through MyChart: NO CALL BACK

## 2024-11-19 NOTE — TELEPHONE ENCOUNTER
11/19/24 Spoke with daughter and Alvin Osborne. Gave them the fax number in [employer] to send the paperwork to so Dr. De Anda can sign it.

## 2025-01-23 RX ORDER — AMLODIPINE BESYLATE 5 MG/1
2.5 TABLET ORAL EVERY 12 HOURS SCHEDULED
Qty: 90 TABLET | Refills: 0 | OUTPATIENT
Start: 2025-01-23

## (undated) DEVICE — ELECTRD ECG CARBON/SNP RL FM A/ 5PK

## (undated) DEVICE — Device

## (undated) DEVICE — INTRO SHEATH PRELUDE SNAP .038 6F 13CM W/SDPRT

## (undated) DEVICE — LOU PACE DEFIB: Brand: MEDLINE INDUSTRIES, INC.

## (undated) DEVICE — LIMB HOLDER, WRIST/ANKLE: Brand: DEROYAL

## (undated) DEVICE — 8 FOOT DISPOSABLE EXTENSION CABLE WITH SAFE CONNECT / ALLIGATOR CLIP